# Patient Record
Sex: FEMALE | Race: ASIAN | NOT HISPANIC OR LATINO
[De-identification: names, ages, dates, MRNs, and addresses within clinical notes are randomized per-mention and may not be internally consistent; named-entity substitution may affect disease eponyms.]

---

## 2017-01-23 ENCOUNTER — APPOINTMENT (OUTPATIENT)
Dept: INTERNAL MEDICINE | Facility: CLINIC | Age: 42
End: 2017-01-23

## 2017-01-25 ENCOUNTER — RX RENEWAL (OUTPATIENT)
Age: 42
End: 2017-01-25

## 2017-02-27 ENCOUNTER — OUTPATIENT (OUTPATIENT)
Dept: OUTPATIENT SERVICES | Facility: HOSPITAL | Age: 42
LOS: 1 days | End: 2017-02-27
Payer: MEDICAID

## 2017-02-27 ENCOUNTER — APPOINTMENT (OUTPATIENT)
Dept: INTERNAL MEDICINE | Facility: CLINIC | Age: 42
End: 2017-02-27

## 2017-02-27 VITALS
WEIGHT: 231 LBS | TEMPERATURE: 99.1 F | HEIGHT: 65 IN | BODY MASS INDEX: 38.49 KG/M2 | SYSTOLIC BLOOD PRESSURE: 120 MMHG | DIASTOLIC BLOOD PRESSURE: 90 MMHG

## 2017-02-27 DIAGNOSIS — I10 ESSENTIAL (PRIMARY) HYPERTENSION: ICD-10-CM

## 2017-02-27 PROCEDURE — G0463: CPT

## 2017-02-28 DIAGNOSIS — J06.9 ACUTE UPPER RESPIRATORY INFECTION, UNSPECIFIED: ICD-10-CM

## 2017-05-08 ENCOUNTER — LABORATORY RESULT (OUTPATIENT)
Age: 42
End: 2017-05-08

## 2017-05-08 ENCOUNTER — OUTPATIENT (OUTPATIENT)
Dept: OUTPATIENT SERVICES | Facility: HOSPITAL | Age: 42
LOS: 1 days | End: 2017-05-08
Payer: MEDICAID

## 2017-05-08 ENCOUNTER — APPOINTMENT (OUTPATIENT)
Dept: INTERNAL MEDICINE | Facility: CLINIC | Age: 42
End: 2017-05-08

## 2017-05-08 VITALS
OXYGEN SATURATION: 98 % | SYSTOLIC BLOOD PRESSURE: 124 MMHG | HEART RATE: 85 BPM | HEIGHT: 65 IN | BODY MASS INDEX: 38.65 KG/M2 | DIASTOLIC BLOOD PRESSURE: 90 MMHG | WEIGHT: 232 LBS

## 2017-05-08 DIAGNOSIS — I10 ESSENTIAL (PRIMARY) HYPERTENSION: ICD-10-CM

## 2017-05-08 DIAGNOSIS — Z87.09 PERSONAL HISTORY OF OTHER DISEASES OF THE RESPIRATORY SYSTEM: ICD-10-CM

## 2017-05-08 LAB
ALBUMIN SERPL ELPH-MCNC: 4.7 G/DL — SIGNIFICANT CHANGE UP (ref 3.3–5)
ALP SERPL-CCNC: 53 U/L — SIGNIFICANT CHANGE UP (ref 40–120)
ALT FLD-CCNC: 18 U/L — SIGNIFICANT CHANGE UP (ref 10–45)
ANION GAP SERPL CALC-SCNC: 21 MMOL/L — HIGH (ref 5–17)
AST SERPL-CCNC: 14 U/L — SIGNIFICANT CHANGE UP (ref 10–40)
BILIRUB SERPL-MCNC: 0.2 MG/DL — SIGNIFICANT CHANGE UP (ref 0.2–1.2)
BUN SERPL-MCNC: 17 MG/DL — SIGNIFICANT CHANGE UP (ref 7–23)
CALCIUM SERPL-MCNC: 11.3 MG/DL — HIGH (ref 8.4–10.5)
CHLORIDE SERPL-SCNC: 102 MMOL/L — SIGNIFICANT CHANGE UP (ref 96–108)
CHOLEST SERPL-MCNC: 224 MG/DL — HIGH (ref 10–199)
CO2 SERPL-SCNC: 23 MMOL/L — SIGNIFICANT CHANGE UP (ref 22–31)
CREAT SERPL-MCNC: 0.73 MG/DL — SIGNIFICANT CHANGE UP (ref 0.5–1.3)
GLUCOSE SERPL-MCNC: 128 MG/DL — HIGH (ref 70–99)
HBA1C BLD-MCNC: 6.7 % — HIGH (ref 4–5.6)
HCT VFR BLD CALC: 43 % — SIGNIFICANT CHANGE UP (ref 34.5–45)
HDLC SERPL-MCNC: 57 MG/DL — SIGNIFICANT CHANGE UP (ref 40–125)
HGB BLD-MCNC: 13.4 G/DL — SIGNIFICANT CHANGE UP (ref 11.5–15.5)
LIPID PNL WITH DIRECT LDL SERPL: 129 MG/DL — SIGNIFICANT CHANGE UP
MCHC RBC-ENTMCNC: 26.2 PG — LOW (ref 27–34)
MCHC RBC-ENTMCNC: 31.2 GM/DL — LOW (ref 32–36)
MCV RBC AUTO: 84 FL — SIGNIFICANT CHANGE UP (ref 80–100)
PLATELET # BLD AUTO: 420 K/UL — HIGH (ref 150–400)
POTASSIUM SERPL-MCNC: 4.9 MMOL/L — SIGNIFICANT CHANGE UP (ref 3.5–5.3)
POTASSIUM SERPL-SCNC: 4.9 MMOL/L — SIGNIFICANT CHANGE UP (ref 3.5–5.3)
PROT SERPL-MCNC: 8.1 G/DL — SIGNIFICANT CHANGE UP (ref 6–8.3)
RBC # BLD: 5.12 M/UL — SIGNIFICANT CHANGE UP (ref 3.8–5.2)
RBC # FLD: 14.4 % — SIGNIFICANT CHANGE UP (ref 10.3–14.5)
SODIUM SERPL-SCNC: 146 MMOL/L — HIGH (ref 135–145)
TOTAL CHOLESTEROL/HDL RATIO MEASUREMENT: 4 RATIO — SIGNIFICANT CHANGE UP (ref 3.3–7.1)
TRIGL SERPL-MCNC: 192 MG/DL — HIGH (ref 10–149)
TSH SERPL-MCNC: 1.32 UIU/ML — SIGNIFICANT CHANGE UP (ref 0.27–4.2)
WBC # BLD: 7.12 K/UL — SIGNIFICANT CHANGE UP (ref 3.8–10.5)
WBC # FLD AUTO: 7.12 K/UL — SIGNIFICANT CHANGE UP (ref 3.8–10.5)

## 2017-05-08 PROCEDURE — 80053 COMPREHEN METABOLIC PANEL: CPT

## 2017-05-08 PROCEDURE — 85027 COMPLETE CBC AUTOMATED: CPT

## 2017-05-08 PROCEDURE — G0463: CPT

## 2017-05-08 PROCEDURE — 84443 ASSAY THYROID STIM HORMONE: CPT

## 2017-05-08 PROCEDURE — 80061 LIPID PANEL: CPT

## 2017-05-08 PROCEDURE — 83036 HEMOGLOBIN GLYCOSYLATED A1C: CPT

## 2017-05-08 RX ORDER — AZITHROMYCIN 250 MG/1
250 TABLET, FILM COATED ORAL
Qty: 6 | Refills: 0 | Status: DISCONTINUED | COMMUNITY
Start: 2017-02-27 | End: 2017-05-08

## 2017-05-12 ENCOUNTER — MEDICATION RENEWAL (OUTPATIENT)
Age: 42
End: 2017-05-12

## 2017-05-18 ENCOUNTER — MEDICATION RENEWAL (OUTPATIENT)
Age: 42
End: 2017-05-18

## 2017-05-19 DIAGNOSIS — E11.9 TYPE 2 DIABETES MELLITUS WITHOUT COMPLICATIONS: ICD-10-CM

## 2017-07-03 ENCOUNTER — LABORATORY RESULT (OUTPATIENT)
Age: 42
End: 2017-07-03

## 2017-07-03 ENCOUNTER — OUTPATIENT (OUTPATIENT)
Dept: OUTPATIENT SERVICES | Facility: HOSPITAL | Age: 42
LOS: 1 days | End: 2017-07-03
Payer: MEDICAID

## 2017-07-03 PROCEDURE — 85027 COMPLETE CBC AUTOMATED: CPT

## 2017-07-03 PROCEDURE — 83036 HEMOGLOBIN GLYCOSYLATED A1C: CPT

## 2017-07-03 PROCEDURE — 80053 COMPREHEN METABOLIC PANEL: CPT

## 2017-07-03 PROCEDURE — 84443 ASSAY THYROID STIM HORMONE: CPT

## 2017-07-03 PROCEDURE — 80061 LIPID PANEL: CPT

## 2017-07-05 DIAGNOSIS — I10 ESSENTIAL (PRIMARY) HYPERTENSION: ICD-10-CM

## 2017-07-05 DIAGNOSIS — E11.9 TYPE 2 DIABETES MELLITUS WITHOUT COMPLICATIONS: ICD-10-CM

## 2017-07-05 LAB
ALBUMIN SERPL ELPH-MCNC: 4.5 G/DL — SIGNIFICANT CHANGE UP (ref 3.3–5)
ALP SERPL-CCNC: 55 U/L — SIGNIFICANT CHANGE UP (ref 40–120)
ALT FLD-CCNC: 19 U/L — SIGNIFICANT CHANGE UP (ref 10–45)
ANION GAP SERPL CALC-SCNC: 17 MMOL/L — SIGNIFICANT CHANGE UP (ref 5–17)
AST SERPL-CCNC: 17 U/L — SIGNIFICANT CHANGE UP (ref 10–40)
BILIRUB SERPL-MCNC: <0.2 MG/DL — SIGNIFICANT CHANGE UP (ref 0.2–1.2)
BUN SERPL-MCNC: 15 MG/DL — SIGNIFICANT CHANGE UP (ref 7–23)
CALCIUM SERPL-MCNC: 10.1 MG/DL — SIGNIFICANT CHANGE UP (ref 8.4–10.5)
CHLORIDE SERPL-SCNC: 101 MMOL/L — SIGNIFICANT CHANGE UP (ref 96–108)
CHOLEST SERPL-MCNC: 214 MG/DL — HIGH (ref 10–199)
CO2 SERPL-SCNC: 24 MMOL/L — SIGNIFICANT CHANGE UP (ref 22–31)
CREAT SERPL-MCNC: 0.63 MG/DL — SIGNIFICANT CHANGE UP (ref 0.5–1.3)
GLUCOSE SERPL-MCNC: 135 MG/DL — HIGH (ref 70–99)
HBA1C BLD-MCNC: 6.8 % — HIGH (ref 4–5.6)
HCT VFR BLD CALC: 43.6 % — SIGNIFICANT CHANGE UP (ref 34.5–45)
HDLC SERPL-MCNC: 58 MG/DL — SIGNIFICANT CHANGE UP (ref 40–125)
HGB BLD-MCNC: 13.4 G/DL — SIGNIFICANT CHANGE UP (ref 11.5–15.5)
LIPID PNL WITH DIRECT LDL SERPL: 120 MG/DL — SIGNIFICANT CHANGE UP
MCHC RBC-ENTMCNC: 27.2 PG — SIGNIFICANT CHANGE UP (ref 27–34)
MCHC RBC-ENTMCNC: 30.7 GM/DL — LOW (ref 32–36)
MCV RBC AUTO: 88.4 FL — SIGNIFICANT CHANGE UP (ref 80–100)
PLATELET # BLD AUTO: 354 K/UL — SIGNIFICANT CHANGE UP (ref 150–400)
POTASSIUM SERPL-MCNC: 4.8 MMOL/L — SIGNIFICANT CHANGE UP (ref 3.5–5.3)
POTASSIUM SERPL-SCNC: 4.8 MMOL/L — SIGNIFICANT CHANGE UP (ref 3.5–5.3)
PROT SERPL-MCNC: 7.5 G/DL — SIGNIFICANT CHANGE UP (ref 6–8.3)
RBC # BLD: 4.93 M/UL — SIGNIFICANT CHANGE UP (ref 3.8–5.2)
RBC # FLD: 14.7 % — HIGH (ref 10.3–14.5)
SODIUM SERPL-SCNC: 142 MMOL/L — SIGNIFICANT CHANGE UP (ref 135–145)
TOTAL CHOLESTEROL/HDL RATIO MEASUREMENT: 3.7 RATIO — SIGNIFICANT CHANGE UP (ref 3.3–7.1)
TRIGL SERPL-MCNC: 180 MG/DL — HIGH (ref 10–149)
TSH SERPL-MCNC: 1.04 UIU/ML — SIGNIFICANT CHANGE UP (ref 0.27–4.2)
WBC # BLD: 5.95 K/UL — SIGNIFICANT CHANGE UP (ref 3.8–10.5)
WBC # FLD AUTO: 5.95 K/UL — SIGNIFICANT CHANGE UP (ref 3.8–10.5)

## 2017-09-01 ENCOUNTER — OUTPATIENT (OUTPATIENT)
Dept: OUTPATIENT SERVICES | Facility: HOSPITAL | Age: 42
LOS: 1 days | End: 2017-09-01
Payer: MEDICAID

## 2017-09-01 PROCEDURE — G9001: CPT

## 2017-09-12 ENCOUNTER — INPATIENT (INPATIENT)
Facility: HOSPITAL | Age: 42
LOS: 2 days | Discharge: HOME | DRG: 872 | End: 2017-09-15
Attending: HOSPITALIST | Admitting: INTERNAL MEDICINE
Payer: MEDICAID

## 2017-09-12 VITALS
DIASTOLIC BLOOD PRESSURE: 111 MMHG | OXYGEN SATURATION: 96 % | RESPIRATION RATE: 20 BRPM | TEMPERATURE: 100 F | HEART RATE: 118 BPM | SYSTOLIC BLOOD PRESSURE: 175 MMHG

## 2017-09-12 DIAGNOSIS — Z90.710 ACQUIRED ABSENCE OF BOTH CERVIX AND UTERUS: Chronic | ICD-10-CM

## 2017-09-12 PROCEDURE — 10061 I&D ABSCESS COMP/MULTIPLE: CPT

## 2017-09-12 PROCEDURE — 99285 EMERGENCY DEPT VISIT HI MDM: CPT | Mod: 25

## 2017-09-12 NOTE — ED ADULT NURSE NOTE - PMH
Diabetes mellitus    GERD (gastroesophageal reflux disease)    Hypertension  onset 8/2012  IBS (irritable bowel syndrome)    Obesity    Uterine cancer    Uterine cancer

## 2017-09-13 DIAGNOSIS — Z29.9 ENCOUNTER FOR PROPHYLACTIC MEASURES, UNSPECIFIED: ICD-10-CM

## 2017-09-13 DIAGNOSIS — E11.9 TYPE 2 DIABETES MELLITUS WITHOUT COMPLICATIONS: ICD-10-CM

## 2017-09-13 DIAGNOSIS — E28.39 OTHER PRIMARY OVARIAN FAILURE: ICD-10-CM

## 2017-09-13 DIAGNOSIS — L02.91 CUTANEOUS ABSCESS, UNSPECIFIED: ICD-10-CM

## 2017-09-13 DIAGNOSIS — R69 ILLNESS, UNSPECIFIED: ICD-10-CM

## 2017-09-13 DIAGNOSIS — I10 ESSENTIAL (PRIMARY) HYPERTENSION: ICD-10-CM

## 2017-09-13 LAB
ALBUMIN SERPL ELPH-MCNC: 4.9 G/DL — SIGNIFICANT CHANGE UP (ref 3.3–5)
ALP SERPL-CCNC: 59 U/L — SIGNIFICANT CHANGE UP (ref 40–120)
ALT FLD-CCNC: 17 U/L RC — SIGNIFICANT CHANGE UP (ref 10–45)
ANION GAP SERPL CALC-SCNC: 18 MMOL/L — HIGH (ref 5–17)
AST SERPL-CCNC: 14 U/L — SIGNIFICANT CHANGE UP (ref 10–40)
BASOPHILS # BLD AUTO: 0.1 K/UL — SIGNIFICANT CHANGE UP (ref 0–0.2)
BASOPHILS NFR BLD AUTO: 0.4 % — SIGNIFICANT CHANGE UP (ref 0–2)
BILIRUB SERPL-MCNC: 0.4 MG/DL — SIGNIFICANT CHANGE UP (ref 0.2–1.2)
BUN SERPL-MCNC: 10 MG/DL — SIGNIFICANT CHANGE UP (ref 7–23)
CALCIUM SERPL-MCNC: 10 MG/DL — SIGNIFICANT CHANGE UP (ref 8.4–10.5)
CHLORIDE SERPL-SCNC: 101 MMOL/L — SIGNIFICANT CHANGE UP (ref 96–108)
CO2 SERPL-SCNC: 23 MMOL/L — SIGNIFICANT CHANGE UP (ref 22–31)
CREAT SERPL-MCNC: 0.68 MG/DL — SIGNIFICANT CHANGE UP (ref 0.5–1.3)
EOSINOPHIL # BLD AUTO: 0.1 K/UL — SIGNIFICANT CHANGE UP (ref 0–0.5)
EOSINOPHIL NFR BLD AUTO: 0.7 % — SIGNIFICANT CHANGE UP (ref 0–6)
GAS PNL BLDV: SIGNIFICANT CHANGE UP
GLUCOSE SERPL-MCNC: 143 MG/DL — HIGH (ref 70–99)
HCT VFR BLD CALC: 41.6 % — SIGNIFICANT CHANGE UP (ref 34.5–45)
HGB BLD-MCNC: 14.1 G/DL — SIGNIFICANT CHANGE UP (ref 11.5–15.5)
LYMPHOCYTES # BLD AUTO: 14.4 % — SIGNIFICANT CHANGE UP (ref 13–44)
LYMPHOCYTES # BLD AUTO: 2 K/UL — SIGNIFICANT CHANGE UP (ref 1–3.3)
MCHC RBC-ENTMCNC: 29 PG — SIGNIFICANT CHANGE UP (ref 27–34)
MCHC RBC-ENTMCNC: 33.8 GM/DL — SIGNIFICANT CHANGE UP (ref 32–36)
MCV RBC AUTO: 85.8 FL — SIGNIFICANT CHANGE UP (ref 80–100)
MONOCYTES # BLD AUTO: 1 K/UL — HIGH (ref 0–0.9)
MONOCYTES NFR BLD AUTO: 7.1 % — SIGNIFICANT CHANGE UP (ref 2–14)
NEUTROPHILS # BLD AUTO: 10.6 K/UL — HIGH (ref 1.8–7.4)
NEUTROPHILS NFR BLD AUTO: 77.5 % — HIGH (ref 43–77)
PLATELET # BLD AUTO: 346 K/UL — SIGNIFICANT CHANGE UP (ref 150–400)
POTASSIUM SERPL-MCNC: 3.6 MMOL/L — SIGNIFICANT CHANGE UP (ref 3.5–5.3)
POTASSIUM SERPL-SCNC: 3.6 MMOL/L — SIGNIFICANT CHANGE UP (ref 3.5–5.3)
PROT SERPL-MCNC: 8.5 G/DL — HIGH (ref 6–8.3)
RBC # BLD: 4.85 M/UL — SIGNIFICANT CHANGE UP (ref 3.8–5.2)
RBC # FLD: 12.3 % — SIGNIFICANT CHANGE UP (ref 10.3–14.5)
SODIUM SERPL-SCNC: 142 MMOL/L — SIGNIFICANT CHANGE UP (ref 135–145)
WBC # BLD: 13.6 K/UL — HIGH (ref 3.8–10.5)
WBC # FLD AUTO: 13.6 K/UL — HIGH (ref 3.8–10.5)

## 2017-09-13 PROCEDURE — 99223 1ST HOSP IP/OBS HIGH 75: CPT | Mod: GC

## 2017-09-13 RX ORDER — LOSARTAN POTASSIUM 100 MG/1
1 TABLET, FILM COATED ORAL
Qty: 0 | Refills: 0 | COMMUNITY

## 2017-09-13 RX ORDER — SODIUM CHLORIDE 9 MG/ML
3000 INJECTION INTRAMUSCULAR; INTRAVENOUS; SUBCUTANEOUS ONCE
Qty: 0 | Refills: 0 | Status: COMPLETED | OUTPATIENT
Start: 2017-09-13 | End: 2017-09-13

## 2017-09-13 RX ORDER — INSULIN LISPRO 100/ML
VIAL (ML) SUBCUTANEOUS
Qty: 0 | Refills: 0 | Status: DISCONTINUED | OUTPATIENT
Start: 2017-09-13 | End: 2017-09-15

## 2017-09-13 RX ORDER — ENOXAPARIN SODIUM 100 MG/ML
40 INJECTION SUBCUTANEOUS EVERY 24 HOURS
Qty: 0 | Refills: 0 | Status: DISCONTINUED | OUTPATIENT
Start: 2017-09-13 | End: 2017-09-15

## 2017-09-13 RX ORDER — IBUPROFEN 200 MG
800 TABLET ORAL ONCE
Qty: 0 | Refills: 0 | Status: COMPLETED | OUTPATIENT
Start: 2017-09-13 | End: 2017-09-13

## 2017-09-13 RX ORDER — DEXTROSE 50 % IN WATER 50 %
25 SYRINGE (ML) INTRAVENOUS ONCE
Qty: 0 | Refills: 0 | Status: DISCONTINUED | OUTPATIENT
Start: 2017-09-13 | End: 2017-09-15

## 2017-09-13 RX ORDER — METOPROLOL TARTRATE 50 MG
12.5 TABLET ORAL
Qty: 0 | Refills: 0 | COMMUNITY

## 2017-09-13 RX ORDER — DEXTROSE 50 % IN WATER 50 %
12.5 SYRINGE (ML) INTRAVENOUS ONCE
Qty: 0 | Refills: 0 | Status: DISCONTINUED | OUTPATIENT
Start: 2017-09-13 | End: 2017-09-15

## 2017-09-13 RX ORDER — GLUCAGON INJECTION, SOLUTION 0.5 MG/.1ML
1 INJECTION, SOLUTION SUBCUTANEOUS ONCE
Qty: 0 | Refills: 0 | Status: DISCONTINUED | OUTPATIENT
Start: 2017-09-13 | End: 2017-09-15

## 2017-09-13 RX ORDER — INFLUENZA VIRUS VACCINE 15; 15; 15; 15 UG/.5ML; UG/.5ML; UG/.5ML; UG/.5ML
0.5 SUSPENSION INTRAMUSCULAR ONCE
Qty: 0 | Refills: 0 | Status: COMPLETED | OUTPATIENT
Start: 2017-09-13 | End: 2017-09-13

## 2017-09-13 RX ORDER — METFORMIN HYDROCHLORIDE 850 MG/1
1 TABLET ORAL
Qty: 0 | Refills: 0 | COMMUNITY

## 2017-09-13 RX ORDER — LOSARTAN POTASSIUM 100 MG/1
25 TABLET, FILM COATED ORAL DAILY
Qty: 0 | Refills: 0 | Status: DISCONTINUED | OUTPATIENT
Start: 2017-09-13 | End: 2017-09-15

## 2017-09-13 RX ORDER — METOPROLOL TARTRATE 50 MG
12.5 TABLET ORAL
Qty: 0 | Refills: 0 | Status: DISCONTINUED | OUTPATIENT
Start: 2017-09-13 | End: 2017-09-13

## 2017-09-13 RX ORDER — DEXTROSE 50 % IN WATER 50 %
1 SYRINGE (ML) INTRAVENOUS ONCE
Qty: 0 | Refills: 0 | Status: DISCONTINUED | OUTPATIENT
Start: 2017-09-13 | End: 2017-09-15

## 2017-09-13 RX ORDER — INSULIN LISPRO 100/ML
VIAL (ML) SUBCUTANEOUS AT BEDTIME
Qty: 0 | Refills: 0 | Status: DISCONTINUED | OUTPATIENT
Start: 2017-09-13 | End: 2017-09-15

## 2017-09-13 RX ORDER — SODIUM CHLORIDE 9 MG/ML
1000 INJECTION, SOLUTION INTRAVENOUS
Qty: 0 | Refills: 0 | Status: DISCONTINUED | OUTPATIENT
Start: 2017-09-13 | End: 2017-09-15

## 2017-09-13 RX ORDER — VANCOMYCIN HCL 1 G
1000 VIAL (EA) INTRAVENOUS EVERY 12 HOURS
Qty: 0 | Refills: 0 | Status: DISCONTINUED | OUTPATIENT
Start: 2017-09-13 | End: 2017-09-15

## 2017-09-13 RX ADMIN — Medication 250 MILLIGRAM(S): at 19:06

## 2017-09-13 RX ADMIN — Medication 100 MILLIGRAM(S): at 18:03

## 2017-09-13 RX ADMIN — Medication 800 MILLIGRAM(S): at 21:50

## 2017-09-13 RX ADMIN — ENOXAPARIN SODIUM 40 MILLIGRAM(S): 100 INJECTION SUBCUTANEOUS at 18:04

## 2017-09-13 RX ADMIN — Medication 800 MILLIGRAM(S): at 20:53

## 2017-09-13 RX ADMIN — Medication 800 MILLIGRAM(S): at 11:29

## 2017-09-13 RX ADMIN — Medication 800 MILLIGRAM(S): at 07:44

## 2017-09-13 RX ADMIN — LOSARTAN POTASSIUM 25 MILLIGRAM(S): 100 TABLET, FILM COATED ORAL at 22:57

## 2017-09-13 RX ADMIN — SODIUM CHLORIDE 6000 MILLILITER(S): 9 INJECTION INTRAMUSCULAR; INTRAVENOUS; SUBCUTANEOUS at 02:10

## 2017-09-13 RX ADMIN — Medication 100 MILLIGRAM(S): at 02:15

## 2017-09-13 NOTE — ED PROVIDER NOTE - ATTENDING CONTRIBUTION TO CARE
I have examined and evaluated this patient with the above resident or PA, and agree with the documented clinical history, exam and plan.   Briefly: 42 yo F with h/o IDDM, presenting to the ED with abscess and celllitis on the left thigh.  Large abscess induration and surrounding cellulitis appreicated on exam; given failure of outpateitn ABx and h/o DM, have started clindamycin intravenously.  Abscess draining spontaneously as per history and exam; further incision/drainage produced additional small amount of purulent material and blood, loculations appreciated and broken up manually.  Wound leaned / dressed.  Blood and wound cultures sent.  Pt will be admitted to medicine service for continued wound checks, iv antibiotics.

## 2017-09-13 NOTE — H&P ADULT - PROBLEM SELECTOR PLAN 5
- SubQ lovenox prophylaxis  - Diabetic diet    Patricia Gandhi MD  Internal Medicine, Intern  Pager [phone]

## 2017-09-13 NOTE — ED ADULT NURSE REASSESSMENT NOTE - NS ED NURSE REASSESS COMMENT FT1
0130- patient alert & oriented x3. V/S recheched. Patient stated "My pain is getting worst"
VS stable she is in no acute distress, pt denies pain at this time, pt pending bed. Safety and comfort maintained

## 2017-09-13 NOTE — ED PROVIDER NOTE - OBJECTIVE STATEMENT
41yof w/ HTN, DM2 p/w left anterior leg abscess and chills. Noticed a small pustule on the leg a week ago, gradually getting larger and more painful. Was seen by her PMD who started ciprofloxacin, however today she started to have increasing pain and drainage. No trauma, bites or known exposures. No measured fevers.

## 2017-09-13 NOTE — H&P ADULT - PMH
Diabetes mellitus    GERD (gastroesophageal reflux disease)    Hypertension  onset 8/2012  IBS (irritable bowel syndrome)    Obesity    Uterine cancer    Uterine cancer Diabetes mellitus    GERD (gastroesophageal reflux disease)    Hypertension  onset 8/2012  IBS (irritable bowel syndrome)    Obesity    Uterine cancer

## 2017-09-13 NOTE — H&P ADULT - PROBLEM SELECTOR PLAN 1
Continues to appear fluctuant. s/p I+D at the ED.   - Resuming clindamycin for MRSA coverage  - Consider cefepime or ciprofloxacin for pseudomonal coverage if patient becomes febrile or with worsening WBC ct  - Surgery consult for possible surgical drainage as continues Continues to appear fluctuant. s/p I+D at the ED.   - Resuming clindamycin for MRSA coverage  - Consider cefepime or ciprofloxacin for pseudomonal coverage if patient becomes febrile or with worsening WBC ct  - Surgery consult for possible surgical drainage Continues to appear fluctuant. s/p I+D at the ED, however unclear if purulence was fully expressed and exam is c/w persistent abscess with cellultis and will likely need additional drainage.  - start vancomycin, check trought before 4th dose  - please have RN demarcate area of cellulitis to monitor for improvement  - f/u wound cx  - Consider cefepime or ciprofloxacin for pseudomonal coverage given dm2 hx if patient becomes febrile or with worsening WBC ct  - Surgery consulted for additional surgical drainage. May need u/s for further eval.  - start ibuprofen prn for pain control

## 2017-09-13 NOTE — H&P ADULT - NSHPPHYSICALEXAM_GEN_ALL_CORE
General: No acute distress  Eyes: No icterus, mildly dry mucous membranes  Pulm: CTAB no r/r/w  Cards: RRR no m/r/g  Abd: soft, NT, ND, BS+ General: No acute distress  Eyes: No icterus, mildly dry mucous membranes  Pulm: CTAB no r/r/w  Cards: RRR no m/r/g  Abd: soft, NT, ND, BS+  Skin: Large several centimeter left thigh lesion with induration and tenderness to palpation, with superficial ulceration  Vasc: 2+ DP pulses, no cyanosis  Psych: appropriate  Ext: No lower extremity edema General: No acute distress  Eyes: No icterus, mildly dry mucous membranes  Neck: supple, no JVD  Pulm: CTAB no r/r/w  Cards: RRR no m/r/g  Abd: soft, NT, ND, BS+  Skin: Large several centimeter left thigh protuberant lesion with induration erythema and tenderness to palpation, with incision cameron with surrounding erythema and warmth. No purulence expressed per lesion.  Vasc: 2+ DP pulses, no cyanosis  Psych: appropriate  Ext: No lower extremity edema

## 2017-09-13 NOTE — ED PROCEDURE NOTE - PROCEDURE ADDITIONAL DETAILS
Abscess draining spontaneously as per history and exam; further incision/drainage produced additional small amount of purulent material and blood, loculations appreciated and broken up manually.  Wound leaned / dressed.

## 2017-09-13 NOTE — ED PROVIDER NOTE - SKIN, MLM
Skin normal color for race, warm, dry. 3cm round area of fluctuance w/ central area of purulent drainage w/ surrounding erythema and induration approx 10cm localized to the mid anterior thigh on the left leg, no crepitus, no

## 2017-09-13 NOTE — H&P ADULT - NSHPSOCIALHISTORY_GEN_ALL_CORE
Does not smoke or use illicit drugs. Occasionally/rarely drinks. Lives with brother and mother. Has a dog. Works as a massage therapist.

## 2017-09-13 NOTE — H&P ADULT - ASSESSMENT
42 yo F pmhx T2DM, HTN, uterine malignancy s/p hysterectomy (Nov 2012) who presents with leg abscess. 40 yo F pmhx T2DM, HTN, obesity, GERD, uterine malignancy s/p hysterectomy (Nov 2012) who presents with left thigh pain/induration admitted for sepsis due to abscess/cellulitis.

## 2017-09-13 NOTE — H&P ADULT - PROBLEM SELECTOR PLAN 3
- Repeat HgA1c in AM  - Continue fingerticks  - Sliding scale insulin  - Hold home metformin a1c 6.8, on metformin  - Continue fingersticks  - Sliding scale insulin  - Hold home metformin

## 2017-09-13 NOTE — H&P ADULT - NSHPLABSRESULTS_GEN_ALL_CORE
CBC Full  -  ( 13 Sep 2017 02:09 )  WBC Count : 13.6 K/uL  Hemoglobin : 14.1 g/dL  Hematocrit : 41.6 %  Platelet Count - Automated : 346 K/uL  Mean Cell Volume : 85.8 fl  Mean Cell Hemoglobin : 29.0 pg  Mean Cell Hemoglobin Concentration : 33.8 gm/dL  Auto Neutrophil # : 10.6 K/uL  Auto Lymphocyte # : 2.0 K/uL  Auto Monocyte # : 1.0 K/uL  Auto Eosinophil # : 0.1 K/uL  Auto Basophil # : 0.1 K/uL  Auto Neutrophil % : 77.5 %  Auto Lymphocyte % : 14.4 %  Auto Monocyte % : 7.1 %  Auto Eosinophil % : 0.7 %  Auto Basophil % : 0.4 %      09-13    142  |  101  |  10  ----------------------------<  143<H>  3.6   |  23  |  0.68    Ca    10.0      13 Sep 2017 02:09    TPro  8.5<H>  /  Alb  4.9  /  TBili  0.4  /  DBili  x   /  AST  14  /  ALT  17  /  AlkPhos  59  09-13      CAPILLARY BLOOD GLUCOSE  120 (13 Sep 2017 16:39)    Blood gas venous with lytes  09-13 @ 02:09  pH 7.39  pCO2 44  pO2 44  bicarbonate 26  hemoglobin --  potassium 3.2    VBG lactate: Blood Gas Venous - Lactate: 1.8 mmoL/L (13 Sep 2017 02:09)    Cultures: Pending CBC Full  -  ( 13 Sep 2017 02:09 )  WBC Count : 13.6 K/uL  Hemoglobin : 14.1 g/dL  Hematocrit : 41.6 %  Platelet Count - Automated : 346 K/uL  Mean Cell Volume : 85.8 fl  Mean Cell Hemoglobin : 29.0 pg  Mean Cell Hemoglobin Concentration : 33.8 gm/dL  Auto Neutrophil # : 10.6 K/uL  Auto Lymphocyte # : 2.0 K/uL  Auto Monocyte # : 1.0 K/uL  Auto Eosinophil # : 0.1 K/uL  Auto Basophil # : 0.1 K/uL  Auto Neutrophil % : 77.5 %  Auto Lymphocyte % : 14.4 %  Auto Monocyte % : 7.1 %  Auto Eosinophil % : 0.7 %  Auto Basophil % : 0.4 %      09-13    142  |  101  |  10  ----------------------------<  143<H>  3.6   |  23  |  0.68    Ca    10.0      13 Sep 2017 02:09    TPro  8.5<H>  /  Alb  4.9  /  TBili  0.4  /  DBili  x   /  AST  14  /  ALT  17  /  AlkPhos  59  09-13      CAPILLARY BLOOD GLUCOSE  120 (13 Sep 2017 16:39)    Blood gas venous with lytes  09-13 @ 02:09  pH 7.39  pCO2 44  pO2 44  bicarbonate 26  hemoglobin --  potassium 3.2    VBG lactate: Blood Gas Venous - Lactate: 1.8 mmoL/L (13 Sep 2017 02:09)    Cultures: Pending    LABS NOTABLE FOR ELEV WBC.

## 2017-09-13 NOTE — H&P ADULT - PROBLEM SELECTOR PLAN 2
- Continue home losartan 25 mg daily  - Hold metoprolol for now - Continue home losartan 25 mg daily  - Hold metoprolol for now as not to complicate sepsis picture

## 2017-09-13 NOTE — ED PROVIDER NOTE - FAMILY HISTORY
Father  Still living? Unknown  Family history of lymphoma, Age at diagnosis: Age Unknown     Mother  Still living? Unknown  Family history of rheumatoid arthritis, Age at diagnosis: Age Unknown

## 2017-09-13 NOTE — ED PROCEDURE NOTE - ATTENDING CONTRIBUTION TO CARE
I have participated in and supervised all key portions of the above procedures and agree with the above documentation. ESTELLE Conklin MD

## 2017-09-13 NOTE — H&P ADULT - NSHPREVIEWOFSYSTEMS_GEN_ALL_CORE
General: No current fevers or chills  Throat: No increased thirst, other than expected from not eating  Pulm: No shortness of breath  Cards: No chest pain or palpitations (although stated had palpitations from anxiety prior to I+D)  GI: No abdominal pain  Ext: Please see HPI Review of Systems:   CONSTITUTIONAL: No fever, weight loss  EYES: No eye pain, visual disturbances, or discharge  ENMT:  No difficulty hearing, tinnitus, vertigo; No sinus or throat pain  RESPIRATORY: No SOB. No cough, wheezing, chills or hemoptysis  CARDIOVASCULAR: No chest pain, palpitations, dizziness  GASTROINTESTINAL: No abdominal or epigastric pain. No nausea, vomiting, or hematemesis; No diarrhea or constipation. No melena or hematochezia.  GENITOURINARY: No dysuria, frequency, hematuria, or incontinence  NEUROLOGICAL: No headaches, memory loss, loss of strength, numbness, or tremors  SKIN: +abscess and redness of leg  LYMPH NODES: No enlarged glands  ENDOCRINE: No heat or cold intolerance; No hair loss  MUSCULOSKELETAL: No joint pain or swelling; No muscle, back pain  PSYCHIATRIC: No depression, anxiety, mood swings, or difficulty sleeping

## 2017-09-13 NOTE — ED PROVIDER NOTE - MEDICAL DECISION MAKING DETAILS
41yof diabetic w/ soft tissue abscess left anterior thigh, failed outpt abx, mounting systemic response. Cultures, labs, IV abx, I+D. Admit to medicine for IV abx. Hemodynamically stable.

## 2017-09-13 NOTE — H&P ADULT - HISTORY OF PRESENT ILLNESS
40 yo F pmhx T2DM, HTN, uterine malignancy s/p hysterectomy (Nov 2012) who presents with leg abscess. 42 yo F pmhx T2DM, HTN, uterine malignancy s/p hysterectomy (Nov 2012) who presents with leg abscess. 40 yo F pmhx T2DM, HTN, uterine malignancy s/p hysterectomy (Nov 2012) who presents with leg abscess. She noticed last Wednesday (about a week ago) a small dot on her leg, that appeared similar to a motley or pimple. After taking a shower she noticed that it had become open. She denied any trauma to her leg or any recent punctures to her leg. She applied neosporin and described that it became red the next day. On Friday she felt that it became firmer/harder with spreading redness. She went to her dermatologist who told her she had an abscess and started her on ciprofloxacin. She then noticed fever and chills with continued pain in her leg. Her pain improved with advil. Her temperature had spiked to 101.3, but then resolved after tylenol. She became concerned as her abscess had enlarged to the size of a golf ball and came to the ED.     While at the ED T99.8, 175/111, , 20, 96% RA. She had her abscess drained in the ED, but per patient this was limited by significant pain. While at the ED she received ibuprofen, clindamycin x1 and 3 L NS. At bedside she no longer has fevers or chills. Her leg pain is well controlled. 40 yo F pmhx T2DM, HTN, obesity, GERD, uterine malignancy s/p hysterectomy (Nov 2012) who presents with leg abscess. She noticed last Wednesday (about a week ago) a small dot on her leg, that appeared similar to a motley or pimple. After taking a shower she noticed that it had become open. She denied any trauma to her leg or any recent punctures to her leg. She applied neosporin and described that it became red the next day. On Friday she felt that it became firmer/harder with spreading redness. She went to her dermatologist who told her she had an abscess and started her on ciprofloxacin. She then noticed fever and chills with continued pain in her leg. Her pain improved with advil. Her temperature had spiked to 101.3, but then resolved after tylenol. She became concerned as her abscess had enlarged to the size of a golf ball and came to the ED. She has never had history of abscesses in the past.    While at the ED T99.8, 175/111, , 20, 96% RA. She had her abscess drained in the ED, but per patient this was limited by significant pain. While at the ED she received ibuprofen, clindamycin x1 and 3 L NS. At bedside she no longer has fevers or chills. Her leg pain is well controlled.

## 2017-09-14 ENCOUNTER — TRANSCRIPTION ENCOUNTER (OUTPATIENT)
Age: 42
End: 2017-09-14

## 2017-09-14 LAB
ANION GAP SERPL CALC-SCNC: 16 MMOL/L — SIGNIFICANT CHANGE UP (ref 5–17)
BASOPHILS # BLD AUTO: 0.03 K/UL — SIGNIFICANT CHANGE UP (ref 0–0.2)
BASOPHILS NFR BLD AUTO: 0.4 % — SIGNIFICANT CHANGE UP (ref 0–2)
BUN SERPL-MCNC: 9 MG/DL — SIGNIFICANT CHANGE UP (ref 7–23)
CALCIUM SERPL-MCNC: 9 MG/DL — SIGNIFICANT CHANGE UP (ref 8.4–10.5)
CHLORIDE SERPL-SCNC: 102 MMOL/L — SIGNIFICANT CHANGE UP (ref 96–108)
CO2 SERPL-SCNC: 23 MMOL/L — SIGNIFICANT CHANGE UP (ref 22–31)
CREAT SERPL-MCNC: 0.39 MG/DL — LOW (ref 0.5–1.3)
EOSINOPHIL # BLD AUTO: 0.19 K/UL — SIGNIFICANT CHANGE UP (ref 0–0.5)
EOSINOPHIL NFR BLD AUTO: 2.6 % — SIGNIFICANT CHANGE UP (ref 0–6)
GLUCOSE SERPL-MCNC: 125 MG/DL — HIGH (ref 70–99)
HBA1C BLD-MCNC: 6.6 % — HIGH (ref 4–5.6)
HCT VFR BLD CALC: 36.5 % — SIGNIFICANT CHANGE UP (ref 34.5–45)
HCV AB S/CO SERPL IA: 0.18 S/CO — SIGNIFICANT CHANGE UP
HCV AB SERPL-IMP: SIGNIFICANT CHANGE UP
HGB BLD-MCNC: 11.8 G/DL — SIGNIFICANT CHANGE UP (ref 11.5–15.5)
HIV 1+2 AB+HIV1 P24 AG SERPL QL IA: SIGNIFICANT CHANGE UP
IMM GRANULOCYTES NFR BLD AUTO: 0.4 % — SIGNIFICANT CHANGE UP (ref 0–1.5)
LYMPHOCYTES # BLD AUTO: 1.55 K/UL — SIGNIFICANT CHANGE UP (ref 1–3.3)
LYMPHOCYTES # BLD AUTO: 21.1 % — SIGNIFICANT CHANGE UP (ref 13–44)
MCHC RBC-ENTMCNC: 27.1 PG — SIGNIFICANT CHANGE UP (ref 27–34)
MCHC RBC-ENTMCNC: 32.3 GM/DL — SIGNIFICANT CHANGE UP (ref 32–36)
MCV RBC AUTO: 83.7 FL — SIGNIFICANT CHANGE UP (ref 80–100)
MONOCYTES # BLD AUTO: 0.7 K/UL — SIGNIFICANT CHANGE UP (ref 0–0.9)
MONOCYTES NFR BLD AUTO: 9.5 % — SIGNIFICANT CHANGE UP (ref 2–14)
NEUTROPHILS # BLD AUTO: 4.83 K/UL — SIGNIFICANT CHANGE UP (ref 1.8–7.4)
NEUTROPHILS NFR BLD AUTO: 66 % — SIGNIFICANT CHANGE UP (ref 43–77)
PLATELET # BLD AUTO: 365 K/UL — SIGNIFICANT CHANGE UP (ref 150–400)
POTASSIUM SERPL-MCNC: 3.5 MMOL/L — SIGNIFICANT CHANGE UP (ref 3.5–5.3)
POTASSIUM SERPL-SCNC: 3.5 MMOL/L — SIGNIFICANT CHANGE UP (ref 3.5–5.3)
RBC # BLD: 4.36 M/UL — SIGNIFICANT CHANGE UP (ref 3.8–5.2)
RBC # FLD: 14 % — SIGNIFICANT CHANGE UP (ref 10.3–14.5)
SODIUM SERPL-SCNC: 141 MMOL/L — SIGNIFICANT CHANGE UP (ref 135–145)
WBC # BLD: 7.33 K/UL — SIGNIFICANT CHANGE UP (ref 3.8–10.5)
WBC # FLD AUTO: 7.33 K/UL — SIGNIFICANT CHANGE UP (ref 3.8–10.5)

## 2017-09-14 PROCEDURE — 76882 US LMTD JT/FCL EVL NVASC XTR: CPT | Mod: 26,LT

## 2017-09-14 PROCEDURE — 99233 SBSQ HOSP IP/OBS HIGH 50: CPT | Mod: GC

## 2017-09-14 RX ORDER — ONDANSETRON 8 MG/1
4 TABLET, FILM COATED ORAL ONCE
Qty: 0 | Refills: 0 | Status: COMPLETED | OUTPATIENT
Start: 2017-09-14 | End: 2017-09-14

## 2017-09-14 RX ORDER — IBUPROFEN 200 MG
600 TABLET ORAL ONCE
Qty: 0 | Refills: 0 | Status: COMPLETED | OUTPATIENT
Start: 2017-09-14 | End: 2017-09-14

## 2017-09-14 RX ORDER — ACETAMINOPHEN 500 MG
650 TABLET ORAL EVERY 6 HOURS
Qty: 0 | Refills: 0 | Status: DISCONTINUED | OUTPATIENT
Start: 2017-09-14 | End: 2017-09-15

## 2017-09-14 RX ADMIN — ONDANSETRON 4 MILLIGRAM(S): 8 TABLET, FILM COATED ORAL at 19:13

## 2017-09-14 RX ADMIN — Medication 600 MILLIGRAM(S): at 13:22

## 2017-09-14 RX ADMIN — Medication 600 MILLIGRAM(S): at 13:52

## 2017-09-14 RX ADMIN — Medication 250 MILLIGRAM(S): at 06:11

## 2017-09-14 RX ADMIN — LOSARTAN POTASSIUM 25 MILLIGRAM(S): 100 TABLET, FILM COATED ORAL at 11:23

## 2017-09-14 RX ADMIN — Medication 650 MILLIGRAM(S): at 06:45

## 2017-09-14 RX ADMIN — Medication 250 MILLIGRAM(S): at 17:59

## 2017-09-14 RX ADMIN — ENOXAPARIN SODIUM 40 MILLIGRAM(S): 100 INJECTION SUBCUTANEOUS at 18:00

## 2017-09-14 RX ADMIN — Medication 0.5 MILLIGRAM(S): at 13:23

## 2017-09-14 NOTE — PROGRESS NOTE ADULT - PROBLEM SELECTOR PLAN 3
a1c 6.8, on metformin  - Continue fingersticks  - Sliding scale insulin  - Hold home metformin a1c 6.6, on metformin  - Continue fingersticks  - Sliding scale insulin  - Hold home metformin

## 2017-09-14 NOTE — DISCHARGE NOTE ADULT - PATIENT PORTAL LINK FT
“You can access the [employer] Patient Portal, offered by [facility], by registering with the following website: [email or web address]”

## 2017-09-14 NOTE — DISCHARGE NOTE ADULT - PROVIDER TOKENS
FREE:[LAST:[Hiram],FIRST:[Patricia],PHONE:[phone]],FAX:[(   [phone]    -],ADDRESS:[[address] [place] [address]  Internal Medicine [facility]  [place]]]

## 2017-09-14 NOTE — DISCHARGE NOTE ADULT - CARE PLAN
Principal Discharge DX:	Abscess  Goal:	Resolution Principal Discharge DX:	Abscess  Goal:	Resolution  Instructions for follow-up, activity and diet:	Please continue to take clindamycin 450 mg four times a day for the next 6 days. If you notice fevers, chills or worsening pain please seek medical attention immediately. Please make an appointment in clinic next week or the following week for close monitoring of your abscess at the [address] [place] [address]. You can request to see Dr. Gandhi the week of September 25th. If there is no availability please take the next available appointment to see a resident during the next two weeks.  Secondary Diagnosis:	Diabetes mellitus  Instructions for follow-up, activity and diet:	Please continue your home metformin. Please make an appointment at clinic. You can request a list of prices of glucometers and test strips during your next visit. Principal Discharge DX:	Abscess  Goal:	Resolution  Instructions for follow-up, activity and diet:	Please continue to take clindamycin 450 mg four times a day for the next 6 days. If you notice fevers, chills or worsening pain please seek medical attention immediately. Please make an appointment in clinic next week or the following week for close monitoring of your abscess at the [address] [place] [address]. You can request to see Dr. Gandhi the week of September 25th. If there is no availability please take the next available appointment to see a resident during the next two weeks.    You can continue to take showers but please keep your wound dry. Please change your dressing daily.  Secondary Diagnosis:	Diabetes mellitus  Instructions for follow-up, activity and diet:	Please continue your home metformin. Please make an appointment at clinic. You can request a list of prices of glucometers and test strips during your next visit. Principal Discharge DX:	Abscess  Goal:	Resolution  Instructions for follow-up, activity and diet:	Please continue to take clindamycin 450 mg four times a day for the next 6 days. Please  your script at [facility]. Please take this medication with probiotics or yogurt between doses. If you notice fevers, chills or worsening pain please seek medical attention immediately. Please make an appointment in clinic next week or the following week for close monitoring of your abscess at the [address] [place] [address]. You can request to see Dr. Gandhi the week of September 25th. If there is no availability please take the next available appointment to see a resident during the next two weeks.    You can continue to take showers but please keep your wound dry. Please change your dressing daily.  Secondary Diagnosis:	Diabetes mellitus  Instructions for follow-up, activity and diet:	Please continue your home metformin. Please make an appointment at clinic. You can request a list of prices of glucometers and test strips during your next visit.

## 2017-09-14 NOTE — DISCHARGE NOTE ADULT - CARE PROVIDER_API CALL
Patricia Gandhi  [address] [facility]  Internal Medicine Clinic  [place]  Phone: [phone]  Fax: (   )    -

## 2017-09-14 NOTE — PROVIDER CONTACT NOTE (OTHER) - ACTION/TREATMENT ORDERED:
MD notified and made aware. as per MD he will come down to assess patient. will continue to monitor.
Left thigh wound cleansed with sterile normal saline dry sterile gauze applied
Wound cleansed with sterile NS. Dry sterile gauge applied
MD notified and made aware, pain medication ordered, and given. will continue to monitor.

## 2017-09-14 NOTE — DISCHARGE NOTE ADULT - PLAN OF CARE
Resolution Please continue to take clindamycin 450 mg four times a day for the next 6 days. If you notice fevers, chills or worsening pain please seek medical attention immediately. Please make an appointment in clinic next week or the following week for close monitoring of your abscess at the [address] [place] [address]. You can request to see Dr. Gandhi the week of September 25th. If there is no availability please take the next available appointment to see a resident during the next two weeks. Please continue your home metformin. Please make an appointment at clinic. You can request a list of prices of glucometers and test strips during your next visit. Please continue to take clindamycin 450 mg four times a day for the next 6 days. If you notice fevers, chills or worsening pain please seek medical attention immediately. Please make an appointment in clinic next week or the following week for close monitoring of your abscess at the [address] [place] [address]. You can request to see Dr. Gandhi the week of September 25th. If there is no availability please take the next available appointment to see a resident during the next two weeks.    You can continue to take showers but please keep your wound dry. Please change your dressing daily. Please continue to take clindamycin 450 mg four times a day for the next 6 days. Please  your script at [facility]. Please take this medication with probiotics or yogurt between doses. If you notice fevers, chills or worsening pain please seek medical attention immediately. Please make an appointment in clinic next week or the following week for close monitoring of your abscess at the [address] [place] [address]. You can request to see Dr. Gandhi the week of September 25th. If there is no availability please take the next available appointment to see a resident during the next two weeks.    You can continue to take showers but please keep your wound dry. Please change your dressing daily.

## 2017-09-14 NOTE — PROVIDER CONTACT NOTE (OTHER) - REASON
Dressing and packing removed in ultrasound dept.
Dressing removed from Left thigh in ultrasound dept.
pt complain of L thigh pain
patient complains of pain at L thigh

## 2017-09-14 NOTE — DISCHARGE NOTE ADULT - MEDICATION SUMMARY - MEDICATIONS TO TAKE
I will START or STAY ON the medications listed below when I get home from the hospital:    acetaminophen 325 mg oral tablet  -- 2 tab(s) by mouth every 6 hours, As needed, Moderate Pain (4 - 6)  -- Indication: For Pain control    losartan 25 mg oral tablet  -- 1 tab(s) by mouth once a day  -- Indication: For Hypertension    metFORMIN 500 mg oral tablet  -- 1 tab(s) by mouth 2 times a day  -- Indication: For Diabetes mellitus    metoprolol  -- 12.5 milligram(s) by mouth 2 times a day  -- Indication: For Hypertension    clindamycin 150 mg oral capsule  -- 3 cap(s) by mouth 4 times a day  -- Indication: For Abscess    estradiol 0.5 mg oral tablet  -- 1 tab(s) by mouth once a day  -- Indication: For Estrogen deficiency

## 2017-09-14 NOTE — PROGRESS NOTE ADULT - ASSESSMENT
40 yo F pmhx T2DM, HTN, obesity, GERD, uterine malignancy s/p hysterectomy (Nov 2012) who presents with left thigh pain/induration admitted for sepsis due to abscess/cellulitis. 40 yo F pmhx T2DM, HTN, obesity, GERD, uterine malignancy s/p hysterectomy (Nov 2012) who presents with left thigh pain/induration admitted for sepsis due to abscess/cellulitis s/p i&d.

## 2017-09-14 NOTE — DISCHARGE NOTE ADULT - HOSPITAL COURSE
HPI:  42 yo F pmhx T2DM, HTN, obesity, GERD, uterine malignancy s/p hysterectomy (Nov 2012) who presents with leg abscess. She noticed last Wednesday (about a week ago) a small dot on her leg, that appeared similar to a motley or pimple. After taking a shower she noticed that it had become open. She denied any trauma to her leg or any recent punctures to her leg. She applied neosporin and described that it became red the next day. On Friday she felt that it became firmer/harder with spreading redness. She went to her dermatologist who told her she had an abscess and started her on ciprofloxacin. She then noticed fever and chills with continued pain in her leg. Her pain improved with advil. Her temperature had spiked to 101.3, but then resolved after tylenol. She became concerned as her abscess had enlarged to the size of a golf ball and came to the ED. She has never had history of abscesses in the past.    While at the ED T99.8, 175/111, , 20, 96% RA. She had her abscess drained in the ED, but per patient this was limited by significant pain. While at the ED she received ibuprofen, clindamycin x1 and 3 L NS. At bedside she no longer has fevers or chills. Her leg pain is well controlled. 	    Hospital Course:  Patient was admitted for left leg abscess. She had an I+D while at the emergency department. Due to concerns of purulence she was switched to vancomycin while inpatient. Her abscess culture returned HPI:  40 yo F pmhx T2DM, HTN, obesity, GERD, uterine malignancy s/p hysterectomy (Nov 2012) who presents with leg abscess. She noticed last Wednesday (about a week ago) a small dot on her leg, that appeared similar to a motley or pimple. After taking a shower she noticed that it had become open. She denied any trauma to her leg or any recent punctures to her leg. She applied neosporin and described that it became red the next day. On Friday she felt that it became firmer/harder with spreading redness. She went to her dermatologist who told her she had an abscess and started her on ciprofloxacin. She then noticed fever and chills with continued pain in her leg. Her pain improved with advil. Her temperature had spiked to 101.3, but then resolved after tylenol. She became concerned as her abscess had enlarged to the size of a golf ball and came to the ED. She has never had history of abscesses in the past.    While at the ED T99.8, 175/111, , 20, 96% RA. She had her abscess drained in the ED, but per patient this was limited by significant pain. While at the ED she received ibuprofen, clindamycin x1 and 3 L NS. At bedside she no longer has fevers or chills. Her leg pain is well controlled. 	    Hospital Course:  Patient was admitted for left leg abscess. She had an I+D while at the emergency department. Due to concerns of purulence she was switched to vancomycin while inpatient. Her abscess culture returned positive for Staph aureus (sensitivities---). Surgery was consulted HPI:  40 yo F pmhx T2DM, HTN, obesity, GERD, uterine malignancy s/p hysterectomy (Nov 2012) who presents with leg abscess. She noticed last Wednesday (about a week ago) a small dot on her leg, that appeared similar to a motley or pimple. After taking a shower she noticed that it had become open. She denied any trauma to her leg or any recent punctures to her leg. She applied neosporin and described that it became red the next day. On Friday she felt that it became firmer/harder with spreading redness. She went to her dermatologist who told her she had an abscess and started her on ciprofloxacin. She then noticed fever and chills with continued pain in her leg. Her pain improved with advil. Her temperature had spiked to 101.3, but then resolved after tylenol. She became concerned as her abscess had enlarged to the size of a golf ball and came to the ED. She has never had history of abscesses in the past.    While at the ED T99.8, 175/111, , 20, 96% RA. She had her abscess drained in the ED, but per patient this was limited by significant pain. While at the ED she received ibuprofen, clindamycin x1 and 3 L NS. At bedside she no longer has fevers or chills. Her leg pain is well controlled. 	    Hospital Course:  Patient was admitted for left leg abscess. She had an I+D while at the emergency department. Due to concerns of purulence she was switched to vancomycin while inpatient. Her abscess culture returned positive for Staph aureus (sensitivities---). Surgery was consulted and did not recommend further surgical debridement. HPI:  40 yo F pmhx T2DM, HTN, obesity, GERD, uterine malignancy s/p hysterectomy (Nov 2012) who presents with leg abscess. She noticed last Wednesday (about a week ago) a small dot on her leg, that appeared similar to a motley or pimple. After taking a shower she noticed that it had become open. She denied any trauma to her leg or any recent punctures to her leg. She applied neosporin and described that it became red the next day. On Friday she felt that it became firmer/harder with spreading redness. She went to her dermatologist who told her she had an abscess and started her on ciprofloxacin. She then noticed fever and chills with continued pain in her leg. Her pain improved with advil. Her temperature had spiked to 101.3, but then resolved after tylenol. She became concerned as her abscess had enlarged to the size of a golf ball and came to the ED. She has never had history of abscesses in the past.    While at the ED T99.8, 175/111, , 20, 96% RA. She had her abscess drained in the ED, but per patient this was limited by significant pain. While at the ED she received ibuprofen, clindamycin x1 and 3 L NS. At bedside she no longer has fevers or chills. Her leg pain is well controlled. 	    Hospital Course:  Patient was admitted for left leg abscess. She had an I+D while at the emergency department. Due to concerns of purulence she was switched to vancomycin while inpatient. Her abscess culture returned positive for Staph aureus (sensitive to oxacillin). Surgery was consulted and did not recommend further surgical debridement. Ultrasound of leg did not show further abscess. She was discharged on clindamycin with follow-up at [address] [place] [address] medicine clinic.

## 2017-09-14 NOTE — PROGRESS NOTE ADULT - SUBJECTIVE AND OBJECTIVE BOX
Patient is a 41y old  Female who presents with a chief complaint of Leg abscess (13 Sep 2017 16:33)      SUBJECTIVE / OVERNIGHT EVENTS:  No overnight events. Requested additional medication for pain control overnight (tylenol).     MEDICATIONS  (STANDING):  enoxaparin Injectable 40 milliGRAM(s) SubCutaneous every 24 hours  insulin lispro (HumaLOG) corrective regimen sliding scale   SubCutaneous three times a day before meals  insulin lispro (HumaLOG) corrective regimen sliding scale   SubCutaneous at bedtime  estradiol 0.5 milliGRAM(s) Oral daily  dextrose 5%. 1000 milliLiter(s) (50 mL/Hr) IV Continuous <Continuous>  dextrose 50% Injectable 12.5 Gram(s) IV Push once  dextrose 50% Injectable 25 Gram(s) IV Push once  dextrose 50% Injectable 25 Gram(s) IV Push once  losartan 25 milliGRAM(s) Oral daily  influenza   Vaccine 0.5 milliLiter(s) IntraMuscular once  vancomycin  IVPB 1000 milliGRAM(s) IV Intermittent every 12 hours    MEDICATIONS  (PRN):  dextrose Gel 1 Dose(s) Oral once PRN Blood Glucose LESS THAN 70 milliGRAM(s)/deciLiter  glucagon  Injectable 1 milliGRAM(s) IntraMuscular once PRN Glucose <70 milliGRAM(s)/deciLiter  acetaminophen   Tablet. 650 milliGRAM(s) Oral every 6 hours PRN Moderate Pain (4 - 6)      CAPILLARY BLOOD GLUCOSE  183 (13 Sep 2017 20:52)  120 (13 Sep 2017 16:39)        I&O's Summary    13 Sep 2017 07:01  -  14 Sep 2017 07:00  --------------------------------------------------------  IN: 990 mL / OUT: 0 mL / NET: 990 mL        PHYSICAL EXAM:  GENERAL: NAD, well-developed  HEAD:  Atraumatic, Normocephalic  CHEST/LUNG: Clear to auscultation bilaterally; No wheezes, rhonchi or gallops  HEART: Regular rate and rhythm; No murmurs, rubs, or gallops  ABDOMEN: Soft, Nontender, Nondistended; Bowel sounds present  EXTREMITIES:  2+ Peripheral Pulses, No clubbing, cyanosis, or edema  PSYCH: Appropriate  NEUROLOGY: non-focal  SKIN: L thigh wound    LABS:                        14.1   13.6  )-----------( 346      ( 13 Sep 2017 02:09 )             41.6     09-13    142  |  101  |  10  ----------------------------<  143<H>  3.6   |  23  |  0.68    Ca    10.0      13 Sep 2017 02:09    TPro  8.5<H>  /  Alb  4.9  /  TBili  0.4  /  DBili  x   /  AST  14  /  ALT  17  /  AlkPhos  59  09-13              RADIOLOGY & ADDITIONAL TESTS:    Imaging Personally Reviewed:    Consultant(s) Notes Reviewed:      Care Discussed with Consultants/Other Providers: Patient is a 41y old  Female who presents with a chief complaint of Leg abscess (13 Sep 2017 16:33)      SUBJECTIVE / OVERNIGHT EVENTS:  No overnight events. Requested additional medication for pain control overnight (tylenol). Pain improved this morning.     MEDICATIONS  (STANDING):  enoxaparin Injectable 40 milliGRAM(s) SubCutaneous every 24 hours  insulin lispro (HumaLOG) corrective regimen sliding scale   SubCutaneous three times a day before meals  insulin lispro (HumaLOG) corrective regimen sliding scale   SubCutaneous at bedtime  estradiol 0.5 milliGRAM(s) Oral daily  dextrose 5%. 1000 milliLiter(s) (50 mL/Hr) IV Continuous <Continuous>  dextrose 50% Injectable 12.5 Gram(s) IV Push once  dextrose 50% Injectable 25 Gram(s) IV Push once  dextrose 50% Injectable 25 Gram(s) IV Push once  losartan 25 milliGRAM(s) Oral daily  influenza   Vaccine 0.5 milliLiter(s) IntraMuscular once  vancomycin  IVPB 1000 milliGRAM(s) IV Intermittent every 12 hours    MEDICATIONS  (PRN):  dextrose Gel 1 Dose(s) Oral once PRN Blood Glucose LESS THAN 70 milliGRAM(s)/deciLiter  glucagon  Injectable 1 milliGRAM(s) IntraMuscular once PRN Glucose <70 milliGRAM(s)/deciLiter  acetaminophen   Tablet. 650 milliGRAM(s) Oral every 6 hours PRN Moderate Pain (4 - 6)      CAPILLARY BLOOD GLUCOSE  183 (13 Sep 2017 20:52)  120 (13 Sep 2017 16:39)    Vital Signs Last 24 Hrs  T(C): 36.4 (14 Sep 2017 05:10), Max: 37.3 (13 Sep 2017 15:30)  T(F): 97.6 (14 Sep 2017 05:10), Max: 99.1 (13 Sep 2017 15:30)  HR: 81 (14 Sep 2017 05:10) (81 - 93)  BP: 129/83 (14 Sep 2017 05:10) (129/83 - 157/92)  BP(mean): --  RR: 17 (14 Sep 2017 05:10) (17 - 18)  SpO2: 98% (14 Sep 2017 05:10) (97% - 100%)    I&O's Summary    13 Sep 2017 07:01  -  14 Sep 2017 07:00  --------------------------------------------------------  IN: 990 mL / OUT: 0 mL / NET: 990 mL      PHYSICAL EXAM:  GENERAL: NAD, well-developed  HEAD:  Atraumatic, Normocephalic  CHEST/LUNG: Clear to auscultation bilaterally; No wheezes, rhonchi or rales  HEART: Regular rate and rhythm; No murmurs, rubs, or gallops  ABDOMEN: Soft, Nontender, Nondistended; Bowel sounds present, no guarding or rigidity  EXTREMITIES:  No edema  PSYCH: Appropriate  NEUROLOGY: non-focal  SKIN: L thigh wound improved in appearance without purulent drainage. Continues to have tenderness and firmness to palpation with purple/erythematous border and ulcerative center. Packing in place.     LABS:                        14.1   13.6  )-----------( 346      ( 13 Sep 2017 02:09 )             41.6     09-13    142  |  101  |  10  ----------------------------<  143<H>  3.6   |  23  |  0.68    Ca    10.0      13 Sep 2017 02:09    TPro  8.5<H>  /  Alb  4.9  /  TBili  0.4  /  DBili  x   /  AST  14  /  ALT  17  /  AlkPhos  59  09-13        RADIOLOGY & ADDITIONAL TESTS:    Imaging Personally Reviewed:    Consultant(s) Notes Reviewed:      Care Discussed with Consultants/Other Providers: Patient is a 41y old  Female who presents with a chief complaint of Leg abscess (13 Sep 2017 16:33)      SUBJECTIVE / OVERNIGHT EVENTS:  No overnight events. Requested additional medication for pain control overnight (tylenol). Pain improved this morning. Was seen by surg in the AM. Reports improvement of abscess appearance. Denies fevers, chills.    MEDICATIONS  (STANDING):  enoxaparin Injectable 40 milliGRAM(s) SubCutaneous every 24 hours  insulin lispro (HumaLOG) corrective regimen sliding scale   SubCutaneous three times a day before meals  insulin lispro (HumaLOG) corrective regimen sliding scale   SubCutaneous at bedtime  estradiol 0.5 milliGRAM(s) Oral daily  dextrose 5%. 1000 milliLiter(s) (50 mL/Hr) IV Continuous <Continuous>  dextrose 50% Injectable 12.5 Gram(s) IV Push once  dextrose 50% Injectable 25 Gram(s) IV Push once  dextrose 50% Injectable 25 Gram(s) IV Push once  losartan 25 milliGRAM(s) Oral daily  influenza   Vaccine 0.5 milliLiter(s) IntraMuscular once  vancomycin  IVPB 1000 milliGRAM(s) IV Intermittent every 12 hours    MEDICATIONS  (PRN):  dextrose Gel 1 Dose(s) Oral once PRN Blood Glucose LESS THAN 70 milliGRAM(s)/deciLiter  glucagon  Injectable 1 milliGRAM(s) IntraMuscular once PRN Glucose <70 milliGRAM(s)/deciLiter  acetaminophen   Tablet. 650 milliGRAM(s) Oral every 6 hours PRN Moderate Pain (4 - 6)        CAPILLARY BLOOD GLUCOSE  183 (13 Sep 2017 20:52)  120 (13 Sep 2017 16:39)    Vital Signs Last 24 Hrs  T(C): 37.2 (14 Sep 2017 11:43), Max: 37.3 (13 Sep 2017 15:30)  T(F): 99 (14 Sep 2017 11:43), Max: 99.1 (13 Sep 2017 15:30)  HR: 89 (14 Sep 2017 11:43) (81 - 93)  BP: 133/77 (14 Sep 2017 11:43) (129/83 - 157/92)  BP(mean): --  RR: 18 (14 Sep 2017 11:43) (17 - 18)  SpO2: 98% (14 Sep 2017 11:43) (97% - 99%)    I&O's Summary    13 Sep 2017 07:01  -  14 Sep 2017 07:00  --------------------------------------------------------  IN: 990 mL / OUT: 0 mL / NET: 990 mL      PHYSICAL EXAM:  GENERAL: NAD, well-developed, obese  HEAD:  Atraumatic, Normocephalic  CHEST/LUNG: Clear to auscultation bilaterally; No wheezes, rhonchi or rales  HEART: Regular rate and rhythm; No murmurs, rubs, or gallops  ABDOMEN: Soft, Nontender, Nondistended; Bowel sounds present, no guarding or rigidity  EXTREMITIES:  No edema  PSYCH: Appropriate  NEUROLOGY: non-focal  SKIN: L thigh wound improved in appearance without purulent drainage. Continues to have tenderness and firmness to palpation with purple/erythematous border and ulcerative center. Packing in place.     LABS:                         11.8   7.33  )-----------( 365      ( 14 Sep 2017 07:30 )             36.5     09-14    141  |  102  |  9   ----------------------------<  125<H>  3.5   |  23  |  0.39<L>    Ca    9.0      14 Sep 2017 07:44    TPro  8.5<H>  /  Alb  4.9  /  TBili  0.4  /  DBili  x   /  AST  14  /  ALT  17  /  AlkPhos  59  09-13      Labs reviewed remarkable for IMPROVEMENT IN WBC, BLOOD CX NEG X 2, WOUND GROWING STAPH AUREUS.

## 2017-09-14 NOTE — DISCHARGE NOTE ADULT - ADDITIONAL INSTRUCTIONS
Please follow-up at clinic in the next two weeks for monitoring of your abscess and diabetes. Please take yogurts in between doses to prevent diarrhea.

## 2017-09-14 NOTE — CONSULT NOTE ADULT - SUBJECTIVE AND OBJECTIVE BOX
GENERAL SURGERY CONSULT NOTE  [id number] - Red surgery  Dr. Kieran Knox    HPI  42 yo F admitted today for L leg abscess.     PAST MEDICAL & SURGICAL HISTORY:  Diabetes mellitus  Obesity  Uterine cancer  IBS (irritable bowel syndrome)  GERD (gastroesophageal reflux disease)  Hypertension: onset 8/2012  H/O total hysterectomy  S/P colonoscopy  History of D&C: malignant uterine polyp removed 9/2012      Systemic:	[ ] Fever	[ ] Chills	[ ] Night sweats    [ ] Fatigue	[ ] Other  [] Cardiovascular:  [] Pulmonary:  [] Renal/Urologic:  [] Gastrointestinal:   [] Metabolic:  [] Neurologic:  [] Hematologic:  [] ENT:  [] Ophthalmologic:  [] Musculoskeletal:    MEDICATIONS  (STANDING):  enoxaparin Injectable 40 milliGRAM(s) SubCutaneous every 24 hours  insulin lispro (HumaLOG) corrective regimen sliding scale   SubCutaneous three times a day before meals  insulin lispro (HumaLOG) corrective regimen sliding scale   SubCutaneous at bedtime  estradiol 0.5 milliGRAM(s) Oral daily  dextrose 5%. 1000 milliLiter(s) (50 mL/Hr) IV Continuous <Continuous>  dextrose 50% Injectable 12.5 Gram(s) IV Push once  dextrose 50% Injectable 25 Gram(s) IV Push once  dextrose 50% Injectable 25 Gram(s) IV Push once  losartan 25 milliGRAM(s) Oral daily  influenza   Vaccine 0.5 milliLiter(s) IntraMuscular once  vancomycin  IVPB 1000 milliGRAM(s) IV Intermittent every 12 hours    MEDICATIONS  (PRN):  dextrose Gel 1 Dose(s) Oral once PRN Blood Glucose LESS THAN 70 milliGRAM(s)/deciLiter  glucagon  Injectable 1 milliGRAM(s) IntraMuscular once PRN Glucose <70 milliGRAM(s)/deciLiter      Allergies    penicillin (Rash)  shellfish (Rash; Anaphylaxis)    Intolerances        SOCIAL HISTORY:    FAMILY HISTORY:  Family history of rheumatoid arthritis (Mother)  Family history of lymphoma (Father): Nonhodgkin lymphoma      Vital Signs Last 24 Hrs  T(C): 36.9 (13 Sep 2017 20:28), Max: 37.3 (13 Sep 2017 07:28)  T(F): 98.4 (13 Sep 2017 20:28), Max: 99.2 (13 Sep 2017 07:28)  HR: 90 (13 Sep 2017 22:55) (90 - 103)  BP: 150/88 (13 Sep 2017 22:55) (130/86 - 159/95)  BP(mean): --  RR: 17 (13 Sep 2017 22:55) (17 - 20)  SpO2: 97% (13 Sep 2017 22:55) (97% - 100%)    PHYSICAL EXAM:    Constitutional: NAD    Eyes: anicteric    ENMT: no rhinorrhea    Neck: supple    Respiratory: Clear bilaterally, respirations not labored, no retractions    Cardiovascular: RRR, NL S1S2    Gastrointestinal: Soft, ND, NT    Genitourinary: Normal external genitalia    Rectal:    Extremities: No deformities    Vascular: Ext. warm, normal cap refill    Neurological: sensation and motor intact to all extremities    Skin: warm, dry, no rash    Lymph Nodes: no palpable adenopathy      LABS:                        14.1   13.6  )-----------( 346      ( 13 Sep 2017 02:09 )             41.6     09-13    142  |  101  |  10  ----------------------------<  143<H>  3.6   |  23  |  0.68    Ca    10.0      13 Sep 2017 02:09    TPro  8.5<H>  /  Alb  4.9  /  TBili  0.4  /  DBili  x   /  AST  14  /  ALT  17  /  AlkPhos  59  09-13          RADIOLOGY & ADDITIONAL STUDIES: GENERAL SURGERY CONSULT NOTE  p9002 - Red surgery  Dr. Kieran Knox    HPI  42 yo F with h/o diabetes admitted today for L leg abscess. Pt noticed a small pimple on her anterior Left leg approximately 1 week ago. It initially opened and drained purulent fluid, however over the past few days it has become larger, more swollen, more painful, and more erythematous. The pt reports the erythema was extending to her groin at the time of admission in the ER. The ER incised and drained the abscess. She was unsure whether they got purulent or sanguinous fluid out. She reports the pain and erythema has improved since the drainage and antibiotic treatment. She denies any prior abscesses. She also denies fevers/chills/malaise.     PAST MEDICAL & SURGICAL HISTORY:  Diabetes mellitus  Obesity  Uterine cancer  IBS (irritable bowel syndrome)  GERD (gastroesophageal reflux disease)  Hypertension: onset 8/2012    H/O total hysterectomy  S/P colonoscopy  History of D&C: malignant uterine polyp removed 9/2012      Systemic:	[ ] Fever	[ ] Chills	[ ] Night sweats    [ ] Fatigue	[ ] Other  [] Cardiovascular:  [] Pulmonary:  [] Renal/Urologic:  [] Gastrointestinal:   [] Metabolic:  [] Neurologic:  [] Hematologic:  [] ENT:  [] Ophthalmologic:  [x] Musculoskeletal: LLE pain    MEDICATIONS  (STANDING):  enoxaparin Injectable 40 milliGRAM(s) SubCutaneous every 24 hours  insulin lispro (HumaLOG) corrective regimen sliding scale   SubCutaneous three times a day before meals  insulin lispro (HumaLOG) corrective regimen sliding scale   SubCutaneous at bedtime  estradiol 0.5 milliGRAM(s) Oral daily  dextrose 5%. 1000 milliLiter(s) (50 mL/Hr) IV Continuous <Continuous>  dextrose 50% Injectable 12.5 Gram(s) IV Push once  dextrose 50% Injectable 25 Gram(s) IV Push once  dextrose 50% Injectable 25 Gram(s) IV Push once  losartan 25 milliGRAM(s) Oral daily  influenza   Vaccine 0.5 milliLiter(s) IntraMuscular once  vancomycin  IVPB 1000 milliGRAM(s) IV Intermittent every 12 hours    MEDICATIONS  (PRN):  dextrose Gel 1 Dose(s) Oral once PRN Blood Glucose LESS THAN 70 milliGRAM(s)/deciLiter  glucagon  Injectable 1 milliGRAM(s) IntraMuscular once PRN Glucose <70 milliGRAM(s)/deciLiter      Allergies    penicillin (Rash)  shellfish (Rash; Anaphylaxis)    Intolerances        SOCIAL HISTORY:    FAMILY HISTORY:  Family history of rheumatoid arthritis (Mother)  Family history of lymphoma (Father): Nonhodgkin lymphoma      Vital Signs Last 24 Hrs  T(C): 36.9 (13 Sep 2017 20:28), Max: 37.3 (13 Sep 2017 07:28)  T(F): 98.4 (13 Sep 2017 20:28), Max: 99.2 (13 Sep 2017 07:28)  HR: 90 (13 Sep 2017 22:55) (90 - 103)  BP: 150/88 (13 Sep 2017 22:55) (130/86 - 159/95)  BP(mean): --  RR: 17 (13 Sep 2017 22:55) (17 - 20)  SpO2: 97% (13 Sep 2017 22:55) (97% - 100%)    PHYSICAL EXAM:    Constitutional: NAD    Eyes: anicteric    ENMT: no rhinorrhea    Neck: supple    Respiratory: Clear bilaterally, respirations not labored, no retractions    Cardiovascular: RRR, NL S1S2    Gastrointestinal: Soft, ND, NT    Genitourinary: Normal external genitalia    Extremities: No deformities, L anterior thigh with approx 15cm area of induration and erythema with central 3 cm area of fluctuance and ulcerated skin. This was probed and a cavity was encountered. No purulent drainage.     Vascular: Ext. warm, normal cap refill    Neurological: sensation and motor intact to all extremities    Skin: warm, dry, no rash    Lymph Nodes: no palpable adenopathy      LABS:                        14.1   13.6  )-----------( 346      ( 13 Sep 2017 02:09 )             41.6     09-13    142  |  101  |  10  ----------------------------<  143<H>  3.6   |  23  |  0.68    Ca    10.0      13 Sep 2017 02:09    TPro  8.5<H>  /  Alb  4.9  /  TBili  0.4  /  DBili  x   /  AST  14  /  ALT  17  /  AlkPhos  59  09-13          RADIOLOGY & ADDITIONAL STUDIES:

## 2017-09-14 NOTE — CONSULT NOTE ADULT - ASSESSMENT
42 yo F with LLE absces (thigh), s/p Incision and drainage by ER staff. Consulted for adequacy of drainage. The wound was probed and the incision appeared to extend into the abscess cavity. The cavity was packed with 1/4 inch packing. No further debridement required at this time.     - Plan to remove the packing at 24- 48h.   - Antibiotics for cellulitis per medicine  - Pain control  - Local wound care with dry gauze  - D/W Dr. Knox

## 2017-09-14 NOTE — PROGRESS NOTE ADULT - PROBLEM SELECTOR PLAN 1
2/2 Staph aureus on culture (without bacteremia) s/p I+D at the ED, however unclear if purulence was fully expressed and exam is c/w persistent abscess. Remains hemodynamically stable  - Start vancomycin, check trough before 4th dose  - Please have RN demarcate area of cellulitis to monitor for improvement  - Consider cefepime or ciprofloxacin for pseudomonal coverage given dm2 hx if patient becomes febrile or with worsening WBC ct  - Surgery consulted for additional surgical drainage. May need u/s for further eval.  - Start ibuprofen prn for pain control 2/2 Staph aureus on culture (without bacteremia) s/p I+D at the ED, however unclear if purulence was fully expressed and exam is c/w persistent abscess. Remains hemodynamically stable  - C/W vancomycin, check trough before 4th dose  - Please have RN demarcate area of cellulitis to monitor for improvement  - Surgery consulted appreciated  - check thigh u/s to eval for further loculations/complications of abscess  - tylenol prn for pain control

## 2017-09-14 NOTE — PROGRESS NOTE ADULT - PROBLEM SELECTOR PLAN 5
- SubQ lovenox prophylaxis  - Diabetic diet    Patricia Gandhi MD  Internal Medicine, Intern  Pager [phone] - SubQ lovenox prophylaxis  - Diabetic diet  - No SW/PT needs    Patricia Gandhi MD  Internal Medicine, Intern  Pager [phone]

## 2017-09-15 VITALS
SYSTOLIC BLOOD PRESSURE: 138 MMHG | RESPIRATION RATE: 18 BRPM | DIASTOLIC BLOOD PRESSURE: 93 MMHG | HEART RATE: 98 BPM | TEMPERATURE: 99 F | OXYGEN SATURATION: 98 %

## 2017-09-15 LAB
-  AMPICILLIN/SULBACTAM: SIGNIFICANT CHANGE UP
-  CEFAZOLIN: SIGNIFICANT CHANGE UP
-  CIPROFLOXACIN: SIGNIFICANT CHANGE UP
-  CLINDAMYCIN: SIGNIFICANT CHANGE UP
-  ERYTHROMYCIN: SIGNIFICANT CHANGE UP
-  GENTAMICIN: SIGNIFICANT CHANGE UP
-  LEVOFLOXACIN: SIGNIFICANT CHANGE UP
-  MOXIFLOXACIN(AEROBIC): SIGNIFICANT CHANGE UP
-  OXACILLIN: SIGNIFICANT CHANGE UP
-  RIFAMPIN: SIGNIFICANT CHANGE UP
-  TETRACYCLINE: SIGNIFICANT CHANGE UP
-  TRIMETHOPRIM/SULFAMETHOXAZOLE: SIGNIFICANT CHANGE UP
-  VANCOMYCIN: SIGNIFICANT CHANGE UP
ANION GAP SERPL CALC-SCNC: 18 MMOL/L — HIGH (ref 5–17)
BUN SERPL-MCNC: 11 MG/DL — SIGNIFICANT CHANGE UP (ref 7–23)
CALCIUM SERPL-MCNC: 9.5 MG/DL — SIGNIFICANT CHANGE UP (ref 8.4–10.5)
CHLORIDE SERPL-SCNC: 103 MMOL/L — SIGNIFICANT CHANGE UP (ref 96–108)
CO2 SERPL-SCNC: 22 MMOL/L — SIGNIFICANT CHANGE UP (ref 22–31)
CREAT SERPL-MCNC: 0.65 MG/DL — SIGNIFICANT CHANGE UP (ref 0.5–1.3)
GLUCOSE SERPL-MCNC: 149 MG/DL — HIGH (ref 70–99)
MAGNESIUM SERPL-MCNC: 2.1 MG/DL — SIGNIFICANT CHANGE UP (ref 1.6–2.6)
METHOD TYPE: SIGNIFICANT CHANGE UP
PHOSPHATE SERPL-MCNC: 3.9 MG/DL — SIGNIFICANT CHANGE UP (ref 2.5–4.5)
POTASSIUM SERPL-MCNC: 3.5 MMOL/L — SIGNIFICANT CHANGE UP (ref 3.5–5.3)
POTASSIUM SERPL-SCNC: 3.5 MMOL/L — SIGNIFICANT CHANGE UP (ref 3.5–5.3)
SODIUM SERPL-SCNC: 143 MMOL/L — SIGNIFICANT CHANGE UP (ref 135–145)
VANCOMYCIN TROUGH SERPL-MCNC: 3.8 UG/ML — LOW (ref 10–20)

## 2017-09-15 PROCEDURE — 87070 CULTURE OTHR SPECIMN AEROBIC: CPT

## 2017-09-15 PROCEDURE — 85014 HEMATOCRIT: CPT

## 2017-09-15 PROCEDURE — 87040 BLOOD CULTURE FOR BACTERIA: CPT

## 2017-09-15 PROCEDURE — 82947 ASSAY GLUCOSE BLOOD QUANT: CPT

## 2017-09-15 PROCEDURE — 84100 ASSAY OF PHOSPHORUS: CPT

## 2017-09-15 PROCEDURE — 99239 HOSP IP/OBS DSCHRG MGMT >30: CPT

## 2017-09-15 PROCEDURE — 76882 US LMTD JT/FCL EVL NVASC XTR: CPT

## 2017-09-15 PROCEDURE — 99285 EMERGENCY DEPT VISIT HI MDM: CPT | Mod: 25

## 2017-09-15 PROCEDURE — 82330 ASSAY OF CALCIUM: CPT

## 2017-09-15 PROCEDURE — 80202 ASSAY OF VANCOMYCIN: CPT

## 2017-09-15 PROCEDURE — 10061 I&D ABSCESS COMP/MULTIPLE: CPT

## 2017-09-15 PROCEDURE — 83605 ASSAY OF LACTIC ACID: CPT

## 2017-09-15 PROCEDURE — 84295 ASSAY OF SERUM SODIUM: CPT

## 2017-09-15 PROCEDURE — 80048 BASIC METABOLIC PNL TOTAL CA: CPT

## 2017-09-15 PROCEDURE — 83735 ASSAY OF MAGNESIUM: CPT

## 2017-09-15 PROCEDURE — 85027 COMPLETE CBC AUTOMATED: CPT

## 2017-09-15 PROCEDURE — 83036 HEMOGLOBIN GLYCOSYLATED A1C: CPT

## 2017-09-15 PROCEDURE — 82803 BLOOD GASES ANY COMBINATION: CPT

## 2017-09-15 PROCEDURE — 80053 COMPREHEN METABOLIC PANEL: CPT

## 2017-09-15 PROCEDURE — 86803 HEPATITIS C AB TEST: CPT

## 2017-09-15 PROCEDURE — 87389 HIV-1 AG W/HIV-1&-2 AB AG IA: CPT

## 2017-09-15 PROCEDURE — 84132 ASSAY OF SERUM POTASSIUM: CPT

## 2017-09-15 PROCEDURE — 87205 SMEAR GRAM STAIN: CPT

## 2017-09-15 PROCEDURE — 87186 SC STD MICRODIL/AGAR DIL: CPT

## 2017-09-15 PROCEDURE — 82435 ASSAY OF BLOOD CHLORIDE: CPT

## 2017-09-15 RX ORDER — ACETAMINOPHEN 500 MG
2 TABLET ORAL
Qty: 0 | Refills: 0 | COMMUNITY
Start: 2017-09-15

## 2017-09-15 RX ADMIN — Medication 450 MILLIGRAM(S): at 16:59

## 2017-09-15 RX ADMIN — LOSARTAN POTASSIUM 25 MILLIGRAM(S): 100 TABLET, FILM COATED ORAL at 06:34

## 2017-09-15 RX ADMIN — Medication 250 MILLIGRAM(S): at 06:43

## 2017-09-15 RX ADMIN — Medication 0.5 MILLIGRAM(S): at 12:52

## 2017-09-15 RX ADMIN — Medication 1: at 10:24

## 2017-09-15 NOTE — PROGRESS NOTE ADULT - PROBLEM SELECTOR PLAN 1
2/2 Staph aureus on culture (without bacteremia) s/p I+D at the ED, w/o persistent abscess on ultrasound. Remains hemodynamically stable  - Switching to clindamycin today  - Surgery consulted appreciated  - Ibuprofen prn for pain control 2/2 Staph aureus on culture (without bacteremia) s/p I+D at the ED, w/o persistent abscess on ultrasound. Remains hemodynamically stable and clinically improved  - Switching to clindamycin oral today for d/c  - Surgery consulted appreciated  - Ibuprofen prn for pain control

## 2017-09-15 NOTE — PROGRESS NOTE ADULT - PROBLEM SELECTOR PLAN 3
A1c 6.6, on metformin at home  - Continue fingersticks  - Sliding scale insulin  - Hold home metformin

## 2017-09-15 NOTE — PROGRESS NOTE ADULT - PROBLEM SELECTOR PLAN 5
- SubQ lovenox prophylaxis  - Diabetic diet  - No SW/PT needs    Patricia Gandhi MD  Internal Medicine, Intern  Pager [phone]

## 2017-09-15 NOTE — PROGRESS NOTE ADULT - SUBJECTIVE AND OBJECTIVE BOX
Patient is a 41y old  Female who presents with a chief complaint of Leg abscess (14 Sep 2017 14:22)      SUBJECTIVE / OVERNIGHT EVENTS:  No overnight events. Improved pain at abscess site. No fevers. No other symptoms.     MEDICATIONS  (STANDING):  enoxaparin Injectable 40 milliGRAM(s) SubCutaneous every 24 hours  insulin lispro (HumaLOG) corrective regimen sliding scale   SubCutaneous three times a day before meals  insulin lispro (HumaLOG) corrective regimen sliding scale   SubCutaneous at bedtime  estradiol 0.5 milliGRAM(s) Oral daily  dextrose 5%. 1000 milliLiter(s) (50 mL/Hr) IV Continuous <Continuous>  dextrose 50% Injectable 12.5 Gram(s) IV Push once  dextrose 50% Injectable 25 Gram(s) IV Push once  dextrose 50% Injectable 25 Gram(s) IV Push once  losartan 25 milliGRAM(s) Oral daily  influenza   Vaccine 0.5 milliLiter(s) IntraMuscular once  vancomycin  IVPB 1000 milliGRAM(s) IV Intermittent every 12 hours    MEDICATIONS  (PRN):  dextrose Gel 1 Dose(s) Oral once PRN Blood Glucose LESS THAN 70 milliGRAM(s)/deciLiter  glucagon  Injectable 1 milliGRAM(s) IntraMuscular once PRN Glucose <70 milliGRAM(s)/deciLiter  acetaminophen   Tablet. 650 milliGRAM(s) Oral every 6 hours PRN Moderate Pain (4 - 6)      CAPILLARY BLOOD GLUCOSE  151 (15 Sep 2017 08:48)  149 (14 Sep 2017 21:18)  130 (14 Sep 2017 19:13)  182 (14 Sep 2017 16:29)  108 (14 Sep 2017 12:25)        I&O's Summary    14 Sep 2017 07:01  -  15 Sep 2017 07:00  --------------------------------------------------------  IN: 970 mL / OUT: 0 mL / NET: 970 mL    Vital Signs Last 24 Hrs  T(C): 36.9 (15 Sep 2017 05:09), Max: 37.2 (14 Sep 2017 11:43)  T(F): 98.4 (15 Sep 2017 05:09), Max: 99 (14 Sep 2017 11:43)  HR: 84 (15 Sep 2017 05:09) (83 - 89)  BP: 120/75 (15 Sep 2017 05:09) (120/75 - 138/93)  BP(mean): --  RR: 17 (15 Sep 2017 05:09) (17 - 18)  SpO2: 96% (15 Sep 2017 05:09) (96% - 98%)    PHYSICAL EXAM:  GENERAL: NAD, well-developed  HEAD:  Atraumatic, Normocephalic  CHEST/LUNG: Clear to auscultation bilaterally; No wheezes, rhonchi or rales  HEART: Regular rate and rhythm; No murmurs, rubs, or gallops  ABDOMEN: Soft, Nontender, Nondistended; Bowel sounds present  EXTREMITIES:  No edema  PSYCH: Appropriate  NEUROLOGY: non-focal  SKIN: Improved appearance of abscess, continues to have purple border with ulcerative area in center    LABS:                        11.8   7.33  )-----------( 365      ( 14 Sep 2017 07:30 )             36.5     09-15    143  |  103  |  11  ----------------------------<  149<H>  3.5   |  22  |  0.65    Ca    9.5      15 Sep 2017 09:03  Phos  3.9     09-15  Mg     2.1     09-15 Patient is a 41y old  Female who presents with a chief complaint of Leg abscess (14 Sep 2017 14:22)      SUBJECTIVE / OVERNIGHT EVENTS:  No overnight events. Improved pain at abscess site. No fevers. No other symptoms.     MEDICATIONS  (STANDING):  enoxaparin Injectable 40 milliGRAM(s) SubCutaneous every 24 hours  insulin lispro (HumaLOG) corrective regimen sliding scale   SubCutaneous three times a day before meals  insulin lispro (HumaLOG) corrective regimen sliding scale   SubCutaneous at bedtime  estradiol 0.5 milliGRAM(s) Oral daily  dextrose 5%. 1000 milliLiter(s) (50 mL/Hr) IV Continuous <Continuous>  dextrose 50% Injectable 12.5 Gram(s) IV Push once  dextrose 50% Injectable 25 Gram(s) IV Push once  dextrose 50% Injectable 25 Gram(s) IV Push once  losartan 25 milliGRAM(s) Oral daily  influenza   Vaccine 0.5 milliLiter(s) IntraMuscular once  vancomycin  IVPB 1000 milliGRAM(s) IV Intermittent every 12 hours    MEDICATIONS  (PRN):  dextrose Gel 1 Dose(s) Oral once PRN Blood Glucose LESS THAN 70 milliGRAM(s)/deciLiter  glucagon  Injectable 1 milliGRAM(s) IntraMuscular once PRN Glucose <70 milliGRAM(s)/deciLiter  acetaminophen   Tablet. 650 milliGRAM(s) Oral every 6 hours PRN Moderate Pain (4 - 6)      CAPILLARY BLOOD GLUCOSE  151 (15 Sep 2017 08:48)  149 (14 Sep 2017 21:18)  130 (14 Sep 2017 19:13)  182 (14 Sep 2017 16:29)  108 (14 Sep 2017 12:25)        I&O's Summary    14 Sep 2017 07:01  -  15 Sep 2017 07:00  --------------------------------------------------------  IN: 970 mL / OUT: 0 mL / NET: 970 mL    Vital Signs Last 24 Hrs  T(C): 36.9 (15 Sep 2017 05:09), Max: 37.2 (14 Sep 2017 11:43)  T(F): 98.4 (15 Sep 2017 05:09), Max: 99 (14 Sep 2017 11:43)  HR: 84 (15 Sep 2017 05:09) (83 - 89)  BP: 120/75 (15 Sep 2017 05:09) (120/75 - 138/93)  BP(mean): --  RR: 17 (15 Sep 2017 05:09) (17 - 18)  SpO2: 96% (15 Sep 2017 05:09) (96% - 98%)    PHYSICAL EXAM:  GENERAL: NAD, well-developed, obese  HEAD:  Atraumatic, Normocephalic  CHEST/LUNG: Clear to auscultation bilaterally; No wheezes, rhonchi or rales  HEART: Regular rate and rhythm; No murmurs, rubs, or gallops  ABDOMEN: Soft, Nontender, Nondistended; Bowel sounds present, no guarding or rigidity  EXTREMITIES:  No edema  PSYCH: Appropriate  NEUROLOGY: non-focal  SKIN: L thigh wound improved appearance without purulent drainage. Continues to have tenderness and firmness to palpation with purple/erythematous border and ulcerative center.     LABS:                         11.8   7.33  )-----------( 365      ( 14 Sep 2017 07:30 )             36.5     09-15    143  |  103  |  11  ----------------------------<  149<H>  3.5   |  22  |  0.65    Ca    9.5      15 Sep 2017 09:03  Phos  3.9     09-15  Mg     2.1     09-15    Labs reviewed.  U/S OF LEFT THIGH reviewed - no loculation, no abscess seen.

## 2017-09-15 NOTE — PROGRESS NOTE ADULT - ASSESSMENT
40 yo F pmhx T2DM, HTN, obesity, GERD, uterine malignancy s/p hysterectomy (Nov 2012) who presents with left thigh pain/induration admitted for sepsis due to abscess/cellulitis s/p i&d. 42 yo F pmhx T2DM, HTN, obesity, GERD, uterine malignancy s/p hysterectomy (Nov 2012) who presents with left thigh pain/induration admitted for sepsis due to abscess/cellulitis s/p i&d with improvement.

## 2017-09-15 NOTE — PROGRESS NOTE ADULT - PROBLEM SELECTOR PLAN 2
- Continue home losartan 25 mg daily  - Hold metoprolol for now as not to complicate sepsis picture
- Continue home losartan 25 mg daily  - Hold metoprolol for now as not to complicate sepsis picture  - Can restart home metoprolol on discharge

## 2017-09-18 PROBLEM — E11.9 TYPE 2 DIABETES MELLITUS WITHOUT COMPLICATIONS: Chronic | Status: ACTIVE | Noted: 2017-09-12

## 2017-09-18 LAB
CULTURE RESULTS: SIGNIFICANT CHANGE UP
ORGANISM # SPEC MICROSCOPIC CNT: SIGNIFICANT CHANGE UP
ORGANISM # SPEC MICROSCOPIC CNT: SIGNIFICANT CHANGE UP
SPECIMEN SOURCE: SIGNIFICANT CHANGE UP

## 2017-09-27 ENCOUNTER — OUTPATIENT (OUTPATIENT)
Dept: OUTPATIENT SERVICES | Facility: HOSPITAL | Age: 42
LOS: 1 days | End: 2017-09-27
Payer: MEDICAID

## 2017-09-27 ENCOUNTER — APPOINTMENT (OUTPATIENT)
Dept: INTERNAL MEDICINE | Facility: CLINIC | Age: 42
End: 2017-09-27
Payer: MEDICAID

## 2017-09-27 VITALS
SYSTOLIC BLOOD PRESSURE: 138 MMHG | OXYGEN SATURATION: 98 % | BODY MASS INDEX: 38.99 KG/M2 | HEIGHT: 65 IN | WEIGHT: 234 LBS | DIASTOLIC BLOOD PRESSURE: 90 MMHG | HEART RATE: 94 BPM

## 2017-09-27 DIAGNOSIS — Z90.710 ACQUIRED ABSENCE OF BOTH CERVIX AND UTERUS: Chronic | ICD-10-CM

## 2017-09-27 PROCEDURE — 99214 OFFICE O/P EST MOD 30 MIN: CPT | Mod: GC

## 2017-09-27 PROCEDURE — G0463: CPT

## 2017-09-27 RX ORDER — METFORMIN HYDROCHLORIDE 1000 MG/1
1000 TABLET, COATED ORAL
Qty: 60 | Refills: 5 | Status: DISCONTINUED | COMMUNITY
Start: 2017-07-07 | End: 2017-09-27

## 2017-09-28 DIAGNOSIS — I10 ESSENTIAL (PRIMARY) HYPERTENSION: ICD-10-CM

## 2017-10-02 ENCOUNTER — APPOINTMENT (OUTPATIENT)
Dept: WOUND CARE | Facility: CLINIC | Age: 42
End: 2017-10-02
Payer: MEDICAID

## 2017-10-02 VITALS
TEMPERATURE: 97.6 F | WEIGHT: 234 LBS | HEART RATE: 74 BPM | HEIGHT: 65 IN | BODY MASS INDEX: 38.99 KG/M2 | DIASTOLIC BLOOD PRESSURE: 78 MMHG | SYSTOLIC BLOOD PRESSURE: 140 MMHG | RESPIRATION RATE: 14 BRPM

## 2017-10-02 PROCEDURE — 11042 DBRDMT SUBQ TIS 1ST 20SQCM/<: CPT

## 2017-10-02 PROCEDURE — 99204 OFFICE O/P NEW MOD 45 MIN: CPT | Mod: 25

## 2017-10-06 DIAGNOSIS — L02.91 CUTANEOUS ABSCESS, UNSPECIFIED: ICD-10-CM

## 2017-10-06 DIAGNOSIS — J32.9 CHRONIC SINUSITIS, UNSPECIFIED: ICD-10-CM

## 2017-10-16 ENCOUNTER — APPOINTMENT (OUTPATIENT)
Dept: WOUND CARE | Facility: CLINIC | Age: 42
End: 2017-10-16
Payer: MEDICAID

## 2017-10-16 VITALS
HEIGHT: 65 IN | TEMPERATURE: 98.6 F | RESPIRATION RATE: 14 BRPM | HEART RATE: 85 BPM | DIASTOLIC BLOOD PRESSURE: 96 MMHG | WEIGHT: 234 LBS | BODY MASS INDEX: 38.99 KG/M2 | SYSTOLIC BLOOD PRESSURE: 145 MMHG

## 2017-10-16 DIAGNOSIS — S71.102A UNSPECIFIED OPEN WOUND, LEFT THIGH, INITIAL ENCOUNTER: ICD-10-CM

## 2017-10-16 PROCEDURE — 99213 OFFICE O/P EST LOW 20 MIN: CPT

## 2017-10-23 ENCOUNTER — OUTPATIENT (OUTPATIENT)
Dept: OUTPATIENT SERVICES | Facility: HOSPITAL | Age: 42
LOS: 1 days | End: 2017-10-23
Payer: MEDICAID

## 2017-10-23 ENCOUNTER — APPOINTMENT (OUTPATIENT)
Dept: OBGYN | Facility: CLINIC | Age: 42
End: 2017-10-23
Payer: MEDICAID

## 2017-10-23 VITALS — SYSTOLIC BLOOD PRESSURE: 128 MMHG | DIASTOLIC BLOOD PRESSURE: 84 MMHG | BODY MASS INDEX: 39.61 KG/M2 | WEIGHT: 238 LBS

## 2017-10-23 DIAGNOSIS — Z78.0 ASYMPTOMATIC MENOPAUSAL STATE: ICD-10-CM

## 2017-10-23 DIAGNOSIS — Z90.710 ACQUIRED ABSENCE OF BOTH CERVIX AND UTERUS: Chronic | ICD-10-CM

## 2017-10-23 DIAGNOSIS — N76.0 ACUTE VAGINITIS: ICD-10-CM

## 2017-10-23 DIAGNOSIS — Z01.419 ENCOUNTER FOR GYNECOLOGICAL EXAMINATION (GENERAL) (ROUTINE) WITHOUT ABNORMAL FINDINGS: ICD-10-CM

## 2017-10-23 PROCEDURE — G0463: CPT

## 2017-10-23 PROCEDURE — 99396 PREV VISIT EST AGE 40-64: CPT | Mod: GC

## 2017-10-30 ENCOUNTER — APPOINTMENT (OUTPATIENT)
Dept: INTERNAL MEDICINE | Facility: CLINIC | Age: 42
End: 2017-10-30
Payer: MEDICAID

## 2017-10-30 ENCOUNTER — OUTPATIENT (OUTPATIENT)
Dept: OUTPATIENT SERVICES | Facility: HOSPITAL | Age: 42
LOS: 1 days | End: 2017-10-30
Payer: MEDICAID

## 2017-10-30 ENCOUNTER — APPOINTMENT (OUTPATIENT)
Dept: WOUND CARE | Facility: CLINIC | Age: 42
End: 2017-10-30
Payer: MEDICAID

## 2017-10-30 VITALS
TEMPERATURE: 98.4 F | DIASTOLIC BLOOD PRESSURE: 89 MMHG | SYSTOLIC BLOOD PRESSURE: 140 MMHG | HEART RATE: 79 BPM | RESPIRATION RATE: 16 BRPM

## 2017-10-30 VITALS
WEIGHT: 238 LBS | DIASTOLIC BLOOD PRESSURE: 80 MMHG | HEIGHT: 65 IN | BODY MASS INDEX: 39.65 KG/M2 | SYSTOLIC BLOOD PRESSURE: 140 MMHG

## 2017-10-30 DIAGNOSIS — I10 ESSENTIAL (PRIMARY) HYPERTENSION: ICD-10-CM

## 2017-10-30 DIAGNOSIS — L02.91 CUTANEOUS ABSCESS, UNSPECIFIED: ICD-10-CM

## 2017-10-30 DIAGNOSIS — Z90.710 ACQUIRED ABSENCE OF BOTH CERVIX AND UTERUS: Chronic | ICD-10-CM

## 2017-10-30 DIAGNOSIS — T14.90XA INJURY, UNSPECIFIED, INITIAL ENCOUNTER: ICD-10-CM

## 2017-10-30 PROCEDURE — 90688 IIV4 VACCINE SPLT 0.5 ML IM: CPT

## 2017-10-30 PROCEDURE — G0008: CPT

## 2017-10-30 PROCEDURE — G0463: CPT

## 2017-10-30 PROCEDURE — 99213 OFFICE O/P EST LOW 20 MIN: CPT | Mod: GE

## 2017-10-30 PROCEDURE — 99213 OFFICE O/P EST LOW 20 MIN: CPT

## 2017-10-30 RX ORDER — FLUTICASONE PROPIONATE 50 UG/1
50 SPRAY, METERED NASAL DAILY
Qty: 1 | Refills: 2 | Status: DISCONTINUED | COMMUNITY
Start: 2017-09-27 | End: 2017-10-30

## 2017-11-01 PROBLEM — L02.91 ABSCESS: Status: ACTIVE | Noted: 2017-09-27

## 2017-11-03 DIAGNOSIS — L02.91 CUTANEOUS ABSCESS, UNSPECIFIED: ICD-10-CM

## 2017-11-03 DIAGNOSIS — Z23 ENCOUNTER FOR IMMUNIZATION: ICD-10-CM

## 2017-11-04 PROBLEM — T14.90XA CLOSED WOUND: Status: ACTIVE | Noted: 2017-11-04

## 2017-11-10 ENCOUNTER — TRANSCRIPTION ENCOUNTER (OUTPATIENT)
Age: 42
End: 2017-11-10

## 2017-11-19 ENCOUNTER — FORM ENCOUNTER (OUTPATIENT)
Age: 42
End: 2017-11-19

## 2017-11-20 ENCOUNTER — OUTPATIENT (OUTPATIENT)
Dept: OUTPATIENT SERVICES | Facility: HOSPITAL | Age: 42
LOS: 1 days | End: 2017-11-20
Payer: MEDICAID

## 2017-11-20 ENCOUNTER — APPOINTMENT (OUTPATIENT)
Dept: MAMMOGRAPHY | Facility: IMAGING CENTER | Age: 42
End: 2017-11-20
Payer: MEDICAID

## 2017-11-20 DIAGNOSIS — Z78.0 ASYMPTOMATIC MENOPAUSAL STATE: ICD-10-CM

## 2017-11-20 DIAGNOSIS — Z90.710 ACQUIRED ABSENCE OF BOTH CERVIX AND UTERUS: Chronic | ICD-10-CM

## 2017-11-20 PROCEDURE — G0202: CPT | Mod: 26

## 2017-11-20 PROCEDURE — 77063 BREAST TOMOSYNTHESIS BI: CPT

## 2017-11-20 PROCEDURE — 77063 BREAST TOMOSYNTHESIS BI: CPT | Mod: 26

## 2017-11-20 PROCEDURE — 77067 SCR MAMMO BI INCL CAD: CPT

## 2018-06-01 ENCOUNTER — APPOINTMENT (OUTPATIENT)
Dept: INTERNAL MEDICINE | Facility: CLINIC | Age: 43
End: 2018-06-01
Payer: MEDICAID

## 2018-06-01 ENCOUNTER — OUTPATIENT (OUTPATIENT)
Dept: OUTPATIENT SERVICES | Facility: HOSPITAL | Age: 43
LOS: 1 days | End: 2018-06-01
Payer: MEDICAID

## 2018-06-01 VITALS
HEIGHT: 65 IN | HEART RATE: 69 BPM | SYSTOLIC BLOOD PRESSURE: 174 MMHG | DIASTOLIC BLOOD PRESSURE: 98 MMHG | WEIGHT: 229 LBS | OXYGEN SATURATION: 99 % | BODY MASS INDEX: 38.15 KG/M2

## 2018-06-01 DIAGNOSIS — I10 ESSENTIAL (PRIMARY) HYPERTENSION: ICD-10-CM

## 2018-06-01 DIAGNOSIS — Z87.891 PERSONAL HISTORY OF NICOTINE DEPENDENCE: ICD-10-CM

## 2018-06-01 DIAGNOSIS — Z87.898 PERSONAL HISTORY OF OTHER SPECIFIED CONDITIONS: ICD-10-CM

## 2018-06-01 DIAGNOSIS — Z78.9 OTHER SPECIFIED HEALTH STATUS: ICD-10-CM

## 2018-06-01 DIAGNOSIS — T88.7XXA UNSPECIFIED ADVERSE EFFECT OF DRUG OR MEDICAMENT, INITIAL ENCOUNTER: ICD-10-CM

## 2018-06-01 DIAGNOSIS — Z90.710 ACQUIRED ABSENCE OF BOTH CERVIX AND UTERUS: Chronic | ICD-10-CM

## 2018-06-01 PROCEDURE — 99213 OFFICE O/P EST LOW 20 MIN: CPT | Mod: GE

## 2018-06-01 PROCEDURE — G0463: CPT

## 2018-06-15 DIAGNOSIS — Z79.899 OTHER LONG TERM (CURRENT) DRUG THERAPY: ICD-10-CM

## 2018-06-15 DIAGNOSIS — E11.9 TYPE 2 DIABETES MELLITUS WITHOUT COMPLICATIONS: ICD-10-CM

## 2018-06-15 NOTE — PHYSICAL EXAM
[No Acute Distress] : no acute distress [Well Nourished] : well nourished [Well Developed] : well developed [Well-Appearing] : well-appearing [Normal Sclera/Conjunctiva] : normal sclera/conjunctiva [No Lymphadenopathy] : no lymphadenopathy [Thyroid Normal, No Nodules] : the thyroid was normal and there were no nodules present [No Respiratory Distress] : no respiratory distress  [Clear to Auscultation] : lungs were clear to auscultation bilaterally [No Accessory Muscle Use] : no accessory muscle use [Normal Rate] : normal rate  [Regular Rhythm] : with a regular rhythm [Normal S1, S2] : normal S1 and S2 [No Murmur] : no murmur heard [No Edema] : there was no peripheral edema [Normal Appearance] : normal in appearance [No Nipple Discharge] : no nipple discharge [No Axillary Lymphadenopathy] : no axillary lymphadenopathy [Soft] : abdomen soft [Non Tender] : non-tender [Non-distended] : non-distended [No Masses] : no abdominal mass palpated [Normal Supraclavicular Nodes] : no supraclavicular lymphadenopathy [Normal Axillary Nodes] : no axillary lymphadenopathy [Normal Posterior Cervical Nodes] : no posterior cervical lymphadenopathy [Normal Femoral Nodes] : no femoral lymphadenopathy [Normal Anterior Cervical Nodes] : no anterior cervical lymphadenopathy [Normal Inguinal Nodes] : no inguinal lymphadenopathy [No Spinal Tenderness] : no spinal tenderness [No Joint Swelling] : no joint swelling [Normal Affect] : the affect was normal [Normal Mood] : the mood was normal [de-identified] : Mildly enlarged tonsils [de-identified] : Limited abdominal exam due to weight [de-identified] : Rash on hands and extensor surfaces of arms [de-identified] : Normal appearance without ulcers, sensation intact under toes and in dorsal web

## 2018-06-15 NOTE — HISTORY OF PRESENT ILLNESS
[de-identified] : 43 yo F pmhx Stage I endometrial cancer s/p COURT-BSO (2012, previously on estrogen), HTN, T2DM (HgA1c July 2017, 6.8%), latent TB s/p 9 month INH, chronic sinusitis, lumbar spine L3-L4 herniation (1/2016), abscess of thigh (Sept 2017, [facility]), who presents for CPE. \par \par Interval: Stopped estrogen. Has had loose stools and eczema. Had uncomfortable hot flashes. Started effexor a few weeks ago. Has not lessened them, feels more used to them. Starts to sweat randomly. Giving it a 2 month trial. Currently on 75 mg. \par \par Worried in past about high heart rate, but never felt tired or palpitations. Has been on 12.5 BID.\par \par Lesion now has healed over. No health concerns today. Has not been able to get a glucometer. Had not fasted today, had coffee today.  \par \par # Diabetes: On metformin and losartan. No numbness or tingling in feet. Sometimes at end of the week will have numbness. No bubbly urine. No ulcers on feet or scabs. \par \par Grandmother had colon cancer in age of 60s, nothing was abnormal. Had colonoscopy for inflammatory bowel disease. Reported it was negative. Was on medication for 6 months. Felt that this was stress related. Has not had bowel issues. Had severe pain in stomach. Was stabbing. No ulcers, but had gastritis. Has not had any problems in at least ten years. Now feels like moving bowels more frequently from stress. No pain. Feels like she is eating and running to bathroom immediately.

## 2018-06-15 NOTE — REVIEW OF SYSTEMS
[Fatigue] : fatigue [Hot Flashes] : hot flashes [Itching] : itching [Joint Pain] : joint pain [Muscle Pain] : muscle pain [Fever] : no fever [Chills] : no chills [Vision Problems] : no vision problems [Hearing Loss] : no hearing loss [Sore Throat] : no sore throat [Chest Pain] : no chest pain [Palpitations] : no palpitations [Lower Ext Edema] : no lower extremity edema [Shortness Of Breath] : no shortness of breath [Wheezing] : no wheezing [Cough] : no cough [Dyspnea on Exertion] : no dyspnea on exertion [Nausea] : no nausea [Vomiting] : no vomiting [Melena] : no melena [Dysuria] : no dysuria [Hematuria] : no hematuria [Headache] : no headache [Easy Bleeding] : no easy bleeding [Easy Bruising] : no easy bruising [FreeTextEntry2] : Hot flashes, 5 times a day (sometimes less or more), weight has been about the same [FreeTextEntry7] : Little nausea from irritable bowel from stress, continues to have loose stools, about 2-4 times a day [FreeTextEntry9] : Feels that this is work related [de-identified] : Rashes under arms, uses corticosteroid sparingly, sometimes itchy [de-identified] : Sometimes lightheaded from not drinking water, tries to carry water with her. [de-identified] : PHQ-2, feels stress from work (financial)

## 2018-06-15 NOTE — PLAN
[FreeTextEntry1] : 43 yo F pmhx Stage I endometrial cancer s/p COURT-BSO (2012, no longer on estrogen), HTN, T2DM (HgA1c July 2017, 6.8%), latent TB s/p 9 month INH, chronic sinusitis, lumbar spine L3-L4 herniation (1/2016), abscess of thigh (Sept 2017, [facility]), who presents for CPE. \par \par # HTN: 174 SBP on initial presentation, on repeat was 132/78 with large cuff. Reports BP of 136-138/82 at home. Given her history of diabetes (cardiovascular risk factor) would be inclined to have stricter blood pressure control (120/90).\par - She felt that she rushed before she came in today. \par - Plan to continue to encourage weight loss and diet for now. In next visit if persistently elevated, may consider increasing dose of losartan. \par \par # History of tachycardia: Asymptomatic currently. No chest pain or palpitations, however has history of diabetes (possible association with beta blockers)\par - Plan to decrease to one pill daily for next 2-3 weeks and to stop thereafter. \par - Advised to continue original dose if feels palpitations or symptomatic\par - Will need HR check and BP check in 5 weeks\par \par # Loose stools: Patient reports history of irritable bowel disease. Likely related to her stress. \par - Continue fiber and relaxation techniques\par - Hold off GI referral, unless bowel habits change or loose stools/diarrhea\par \par # Obesity (BMI 38.11): Improvement of weight since last visit. \par - Encouraged continued exercise and diet control\par \par # Diabetes:\par - Microalbumin: Cr ratio today\par - Foot examination without evidence of ulcer of loss of sensation\par - Opthalmology referral today\par - Unable to perform in office POCT HgA1c (no cartridges available), sending with whole blood today\par - Pneumovax up to date \par \par HCM:\par Pap: Has not had pap smear. Goes to gynecologist in November. Had cervix removed. \par Mammo: BIRADS 2 - November 2017\par Colonoscopy: in 2008 \par HCV/HIV negative\par Depression screening: PHQ-2 was negative\par Vaccines: Pneumovax (2016), TDAP (2012)\par \par - Lipid/CBC/CMP\par \par D/w Dr. Barnes\par RTC in 5 weeks (for HR check and blood pressure check)

## 2018-07-02 LAB
ALBUMIN SERPL ELPH-MCNC: 4.9 G/DL
ALP BLD-CCNC: 54 U/L
ALT SERPL-CCNC: 43 U/L
ANION GAP SERPL CALC-SCNC: 18 MMOL/L
AST SERPL-CCNC: 28 U/L
BILIRUB SERPL-MCNC: 0.3 MG/DL
BUN SERPL-MCNC: 14 MG/DL
CALCIUM SERPL-MCNC: 10.6 MG/DL
CHLORIDE SERPL-SCNC: 100 MMOL/L
CHOLEST SERPL-MCNC: 163 MG/DL
CHOLEST/HDLC SERPL: 3.1 RATIO
CO2 SERPL-SCNC: 25 MMOL/L
CREAT SERPL-MCNC: 0.75 MG/DL
GLUCOSE SERPL-MCNC: 131 MG/DL
HBA1C MFR BLD HPLC: 7.6 %
HDLC SERPL-MCNC: 53 MG/DL
LDLC SERPL CALC-MCNC: 73 MG/DL
POTASSIUM SERPL-SCNC: 4.2 MMOL/L
PROT SERPL-MCNC: 8.5 G/DL
SODIUM SERPL-SCNC: 143 MMOL/L
TRIGL SERPL-MCNC: 187 MG/DL

## 2018-07-06 ENCOUNTER — OUTPATIENT (OUTPATIENT)
Dept: OUTPATIENT SERVICES | Facility: HOSPITAL | Age: 43
LOS: 1 days | End: 2018-07-06
Payer: MEDICAID

## 2018-07-06 ENCOUNTER — APPOINTMENT (OUTPATIENT)
Dept: INTERNAL MEDICINE | Facility: CLINIC | Age: 43
End: 2018-07-06
Payer: MEDICAID

## 2018-07-06 VITALS
BODY MASS INDEX: 36.99 KG/M2 | DIASTOLIC BLOOD PRESSURE: 80 MMHG | WEIGHT: 222 LBS | SYSTOLIC BLOOD PRESSURE: 124 MMHG | HEIGHT: 65 IN

## 2018-07-06 VITALS — HEART RATE: 91 BPM | SYSTOLIC BLOOD PRESSURE: 124 MMHG | DIASTOLIC BLOOD PRESSURE: 90 MMHG

## 2018-07-06 DIAGNOSIS — Z90.710 ACQUIRED ABSENCE OF BOTH CERVIX AND UTERUS: Chronic | ICD-10-CM

## 2018-07-06 DIAGNOSIS — I10 ESSENTIAL (PRIMARY) HYPERTENSION: ICD-10-CM

## 2018-07-06 DIAGNOSIS — E66.9 OBESITY, UNSPECIFIED: ICD-10-CM

## 2018-07-06 PROCEDURE — 99213 OFFICE O/P EST LOW 20 MIN: CPT | Mod: GE

## 2018-07-06 PROCEDURE — G0463: CPT

## 2018-07-06 RX ORDER — METOPROLOL TARTRATE 25 MG/1
25 TABLET, FILM COATED ORAL
Qty: 90 | Refills: 0 | Status: DISCONTINUED | COMMUNITY
Start: 2017-05-08 | End: 2018-07-06

## 2018-07-07 NOTE — PHYSICAL EXAM
[No Acute Distress] : no acute distress [Well Nourished] : well nourished [Well Developed] : well developed [Well-Appearing] : well-appearing [No Respiratory Distress] : no respiratory distress  [Clear to Auscultation] : lungs were clear to auscultation bilaterally [No Accessory Muscle Use] : no accessory muscle use [Normal Rate] : normal rate  [Regular Rhythm] : with a regular rhythm [Normal S1, S2] : normal S1 and S2 [No Murmur] : no murmur heard [No Edema] : there was no peripheral edema [Soft] : abdomen soft [Non Tender] : non-tender [Non-distended] : non-distended [Normal Affect] : the affect was normal [Normal Mood] : the mood was normal [de-identified] : Plamar eczematous rash

## 2018-07-07 NOTE — PLAN
[FreeTextEntry1] : 41 yo F pmhx Stage I endometrial cancer s/p COURT-BSO (2012, previously on estrogen), HTN, T2DM (HgA1c July 2017, 6.8%), latent TB s/p 9 month INH, chronic sinusitis, lumbar spine L3-L4 herniation (1/2016), abscess of thigh (Sept 2017, [facility]), who presents for f/u. \par \par # HTN: Well controlled in 120s systolic. \par - Continue losartan 25 daily (not on ACE due to cough)\par - Continue off metoprolol\par \par # T2DM: \par - Changed metformin this week to 1000 BID\par - Does not report any symptoms of abdominal discomfort or diarrhea\par - Repeat HgA1c in 3 month visit\par \par # Medication cessation:\par - Stopped metoprolol. Was previously on it for tachycardia.\par - HR in 90s. Continue to monitor off medication. \par \par # HyperCa\par - Decreased calcium to daily (300) (rather than )\par - Will need repeat laboratories (calcium) during next visit. \par \par # Obesity: Lost 7 lbs\par - Continue diet and exercise\par - Reports decreased appetite due to heat outside from summer\par \par D/w Chaka Benton\par - RTC 3 months T2DM check, HR check

## 2018-07-07 NOTE — REVIEW OF SYSTEMS
[Skin Rash] : skin rash [Fever] : no fever [Chills] : no chills [Chest Pain] : no chest pain [Palpitations] : no palpitations [Shortness Of Breath] : no shortness of breath [Abdominal Pain] : no abdominal pain [Diarrhea] : diarrhea [de-identified] : Improving [de-identified] : Carpal tunnel

## 2018-07-07 NOTE — HISTORY OF PRESENT ILLNESS
[de-identified] : 43 yo F pmhx Stage I endometrial cancer s/p COURT-BSO (2012, previously on estrogen), HTN, T2DM (HgA1c July 2017, 6.8%), latent TB s/p 9 month INH, chronic sinusitis, lumbar spine L3-L4 herniation (1/2016), abscess of thigh (Sept 2017, [facility]), who presents for f/u. \par \par - Blood pressure well controlled in visit today (124/70 systolic) with HR in 90s, on losartan, lisinopril caused cough previously\par - T2DM - went up on metformin earlier this week to 1000 BID\par \par - Has new dog which has kept her active. Now walks her dogs twice a day. \par - no lightheadedness dizziness except when getting up quickly\par - No diarrhea or abdominal upset. Last time it took a week from starting metformin prior to having symptoms. \par - No fevers/chills/SOB, CP/palpitations\par - Hands feels numbness from carpal tunnel\par - Eczema, goes through phases, then starts up again, tries not to use corticosteroids, but uses for certain spots. If really itchy if not moisturizes often. Due to see dermatologist. Last time saw her was w/ staph infection. Uses betamethasone. Has notices improved muscle aches with vitamin D. Looks at multivitamin and calcium.

## 2018-07-10 DIAGNOSIS — E66.9 OBESITY, UNSPECIFIED: ICD-10-CM

## 2018-07-10 DIAGNOSIS — Z79.899 OTHER LONG TERM (CURRENT) DRUG THERAPY: ICD-10-CM

## 2018-08-06 ENCOUNTER — APPOINTMENT (OUTPATIENT)
Dept: INTERNAL MEDICINE | Facility: CLINIC | Age: 43
End: 2018-08-06

## 2018-10-15 ENCOUNTER — APPOINTMENT (OUTPATIENT)
Dept: INTERNAL MEDICINE | Facility: CLINIC | Age: 43
End: 2018-10-15
Payer: MEDICAID

## 2018-10-15 ENCOUNTER — LABORATORY RESULT (OUTPATIENT)
Age: 43
End: 2018-10-15

## 2018-10-15 ENCOUNTER — RESULT CHARGE (OUTPATIENT)
Age: 43
End: 2018-10-15

## 2018-10-15 VITALS
SYSTOLIC BLOOD PRESSURE: 130 MMHG | WEIGHT: 222 LBS | BODY MASS INDEX: 36.99 KG/M2 | DIASTOLIC BLOOD PRESSURE: 80 MMHG | HEIGHT: 65 IN

## 2018-10-15 VITALS — OXYGEN SATURATION: 98 % | HEART RATE: 97 BPM

## 2018-10-15 LAB — HBA1C MFR BLD HPLC: 6.9

## 2018-10-15 PROCEDURE — 99213 OFFICE O/P EST LOW 20 MIN: CPT | Mod: GE

## 2018-10-15 RX ORDER — MULTIVIT-MIN/FOLIC/VIT K/LYCOP 400-300MCG
50 MCG TABLET ORAL
Refills: 0 | Status: DISCONTINUED | COMMUNITY
Start: 2018-07-06 | End: 2018-10-15

## 2018-10-19 ENCOUNTER — LABORATORY RESULT (OUTPATIENT)
Age: 43
End: 2018-10-19

## 2018-10-19 LAB
ANION GAP SERPL CALC-SCNC: 12 MMOL/L
BUN SERPL-MCNC: 15 MG/DL
CALCIUM SERPL-MCNC: 10.7 MG/DL
CHLORIDE SERPL-SCNC: 101 MMOL/L
CO2 SERPL-SCNC: 29 MMOL/L
CREAT SERPL-MCNC: 0.81 MG/DL
GLUCOSE SERPL-MCNC: 109 MG/DL
POTASSIUM SERPL-SCNC: 4.7 MMOL/L
SODIUM SERPL-SCNC: 142 MMOL/L
TSH SERPL-ACNC: 0.53 UIU/ML

## 2018-10-23 ENCOUNTER — LABORATORY RESULT (OUTPATIENT)
Age: 43
End: 2018-10-23

## 2018-10-24 ENCOUNTER — OUTPATIENT (OUTPATIENT)
Dept: OUTPATIENT SERVICES | Facility: HOSPITAL | Age: 43
LOS: 1 days | End: 2018-10-24
Payer: MEDICAID

## 2018-10-24 ENCOUNTER — APPOINTMENT (OUTPATIENT)
Dept: OBGYN | Facility: CLINIC | Age: 43
End: 2018-10-24
Payer: MEDICAID

## 2018-10-24 VITALS — WEIGHT: 122 LBS | BODY MASS INDEX: 20.3 KG/M2 | DIASTOLIC BLOOD PRESSURE: 98 MMHG | SYSTOLIC BLOOD PRESSURE: 150 MMHG

## 2018-10-24 DIAGNOSIS — Z90.710 ACQUIRED ABSENCE OF BOTH CERVIX AND UTERUS: Chronic | ICD-10-CM

## 2018-10-24 DIAGNOSIS — N76.0 ACUTE VAGINITIS: ICD-10-CM

## 2018-10-24 DIAGNOSIS — Z78.0 ASYMPTOMATIC MENOPAUSAL STATE: ICD-10-CM

## 2018-10-24 DIAGNOSIS — Z01.419 ENCOUNTER FOR GYNECOLOGICAL EXAMINATION (GENERAL) (ROUTINE) W/OUT ABNORMAL FINDINGS: ICD-10-CM

## 2018-10-24 PROCEDURE — 87591 N.GONORRHOEAE DNA AMP PROB: CPT

## 2018-10-24 PROCEDURE — G0463: CPT

## 2018-10-24 PROCEDURE — 87800 DETECT AGNT MULT DNA DIREC: CPT

## 2018-10-24 PROCEDURE — 87491 CHLMYD TRACH DNA AMP PROBE: CPT

## 2018-10-24 PROCEDURE — 99213 OFFICE O/P EST LOW 20 MIN: CPT | Mod: GE

## 2018-10-25 LAB
C TRACH RRNA SPEC QL NAA+PROBE: SIGNIFICANT CHANGE UP
CANDIDA AB TITR SER: SIGNIFICANT CHANGE UP
G VAGINALIS DNA SPEC QL NAA+PROBE: DETECTED
N GONORRHOEA RRNA SPEC QL NAA+PROBE: SIGNIFICANT CHANGE UP
SPECIMEN SOURCE: SIGNIFICANT CHANGE UP
T VAGINALIS SPEC QL WET PREP: SIGNIFICANT CHANGE UP

## 2018-10-26 ENCOUNTER — RESULT REVIEW (OUTPATIENT)
Age: 43
End: 2018-10-26

## 2018-10-26 ENCOUNTER — MESSAGE (OUTPATIENT)
Age: 43
End: 2018-10-26

## 2018-10-26 DIAGNOSIS — B96.89 ACUTE VAGINITIS: ICD-10-CM

## 2018-10-26 DIAGNOSIS — N76.0 ACUTE VAGINITIS: ICD-10-CM

## 2018-11-01 NOTE — ASSESSMENT
[FreeTextEntry1] : 43 yo F pmhx Stage I endometrial cancer s/p COURT-BSO (2012, no longer on estrogen), HTN, T2DM (HgA1c July 2017, 6.8%), latent TB s/p 9 month INH, chronic sinusitis, lumbar spine L3-L4 herniation (1/2016), abscess of thigh (Sept 2017, [facility]), who presents for follow-up visit. \par \par # BP: Well controlled 134 SBP today on repeat (130/80 initially)\par - Continue losartan 25 daily\par \par # DM (HgA1c 6.9% today improved from 7.6%). \par - Continue metformin 1000 BID\par - Up to date with pneumovax (2016)\par - Will need updated microalbumin:Cr, continue ARB for BP control\par - Requested Jolene's help with setting up opthalmology appt as patient has had difficulty setting this up\par - Microfilament test normal\par \par # Mild hypercalcemia: (10.6)\par - Repeat BMP\par - Has stopped Ca/Vit D supplementation\par \par # Tiredness/loss of hair\par - TSH w/ reflex T4\par \par # Recent styes: Appears resolved\par - Unclear if susceptible to infections, however DM well controlled\par \par D/w Dr. Lind\par RTC 6 months (repeat blood work, DM check, BP check)

## 2018-11-01 NOTE — REVIEW OF SYSTEMS
[Fatigue] : fatigue [Hot Flashes] : hot flashes [Chest Pain] : no chest pain [Palpitations] : no palpitations [Frequency] : no frequency [de-identified] : No lightheadedness

## 2018-11-01 NOTE — HISTORY OF PRESENT ILLNESS
[FreeTextEntry1] : Follow-up Visit [de-identified] : 43 yo F pmhx Stage I endometrial cancer s/p COURT-BSO (2012, no longer on estrogen), HTN, T2DM (HgA1c July 2017, 6.8%), latent TB s/p 9 month INH, chronic sinusitis, lumbar spine L3-L4 herniation (1/2016), abscess of thigh (Sept 2017, [facility]), who presents for follow-up. \par \par # DM: No increased urination/lightheadedness or dizziness. \par \par # BP check: Has not had palpitations off of metoprolol. No chest pressure. Sometimes has headaches, more from tension from neck tightness, followed by sinus as face hurt. Work has not changed too much, but once Oct starts becomes busy. Base of occiput feels tight. Feels like tightness in jaw and wakes up w/ HA. But not woken up from sleep. Sometimes when rains a lot feels HA. usually does not take anything. \par \par # Lately feels more tired like can sleep the whole day. Feels hair falls out more. Does not feel cold all the time. Continues to have hot flashes. Thursday, prefers to drink cold water. Finds that she is more thirsty than usual\par \par # Stopped calcium because Ca was high, stopped vit D as well. Mainly takes multivitamin. Been taking venlafaxine every other day. Has not helped with hot flashes

## 2018-11-01 NOTE — PHYSICAL EXAM
[No Acute Distress] : no acute distress [Well Nourished] : well nourished [Well Developed] : well developed [Well-Appearing] : well-appearing [Thyroid Normal, No Nodules] : the thyroid was normal and there were no nodules present [No Respiratory Distress] : no respiratory distress  [Clear to Auscultation] : lungs were clear to auscultation bilaterally [No Accessory Muscle Use] : no accessory muscle use [Normal Rate] : normal rate  [Regular Rhythm] : with a regular rhythm [Normal S1, S2] : normal S1 and S2 [No Murmur] : no murmur heard [No Edema] : there was no peripheral edema [Soft] : abdomen soft [Non Tender] : non-tender [Non-distended] : non-distended [No Rash] : no rash [] : both feet [de-identified] : Both feet normal monofilament testing 1-6, 9,10

## 2018-11-02 DIAGNOSIS — Z79.899 OTHER LONG TERM (CURRENT) DRUG THERAPY: ICD-10-CM

## 2018-11-08 ENCOUNTER — APPOINTMENT (OUTPATIENT)
Dept: OPHTHALMOLOGY | Facility: CLINIC | Age: 43
End: 2018-11-08

## 2018-12-03 ENCOUNTER — APPOINTMENT (OUTPATIENT)
Dept: MAMMOGRAPHY | Facility: IMAGING CENTER | Age: 43
End: 2018-12-03

## 2018-12-03 ENCOUNTER — APPOINTMENT (OUTPATIENT)
Dept: RADIOLOGY | Facility: IMAGING CENTER | Age: 43
End: 2018-12-03

## 2018-12-09 ENCOUNTER — FORM ENCOUNTER (OUTPATIENT)
Age: 43
End: 2018-12-09

## 2018-12-10 ENCOUNTER — APPOINTMENT (OUTPATIENT)
Dept: RADIOLOGY | Facility: IMAGING CENTER | Age: 43
End: 2018-12-10
Payer: MEDICAID

## 2018-12-10 ENCOUNTER — OUTPATIENT (OUTPATIENT)
Dept: OUTPATIENT SERVICES | Facility: HOSPITAL | Age: 43
LOS: 1 days | End: 2018-12-10
Payer: MEDICAID

## 2018-12-10 ENCOUNTER — APPOINTMENT (OUTPATIENT)
Dept: MAMMOGRAPHY | Facility: IMAGING CENTER | Age: 43
End: 2018-12-10
Payer: MEDICAID

## 2018-12-10 DIAGNOSIS — Z01.419 ENCOUNTER FOR GYNECOLOGICAL EXAMINATION (GENERAL) (ROUTINE) WITHOUT ABNORMAL FINDINGS: ICD-10-CM

## 2018-12-10 DIAGNOSIS — M81.0 AGE-RELATED OSTEOPOROSIS WITHOUT CURRENT PATHOLOGICAL FRACTURE: ICD-10-CM

## 2018-12-10 DIAGNOSIS — Z90.710 ACQUIRED ABSENCE OF BOTH CERVIX AND UTERUS: Chronic | ICD-10-CM

## 2018-12-10 PROCEDURE — 77067 SCR MAMMO BI INCL CAD: CPT

## 2018-12-10 PROCEDURE — 77063 BREAST TOMOSYNTHESIS BI: CPT | Mod: 26

## 2018-12-10 PROCEDURE — 77067 SCR MAMMO BI INCL CAD: CPT | Mod: 26

## 2018-12-10 PROCEDURE — 77080 DXA BONE DENSITY AXIAL: CPT

## 2018-12-10 PROCEDURE — 77080 DXA BONE DENSITY AXIAL: CPT | Mod: 26

## 2018-12-10 PROCEDURE — 77063 BREAST TOMOSYNTHESIS BI: CPT

## 2018-12-18 ENCOUNTER — APPOINTMENT (OUTPATIENT)
Dept: OPHTHALMOLOGY | Facility: CLINIC | Age: 43
End: 2018-12-18
Payer: MEDICAID

## 2018-12-18 DIAGNOSIS — H04.123 DRY EYE SYNDROME OF BILATERAL LACRIMAL GLANDS: ICD-10-CM

## 2018-12-18 DIAGNOSIS — H52.203 MYOPIA, BILATERAL: ICD-10-CM

## 2018-12-18 DIAGNOSIS — H52.13 MYOPIA, BILATERAL: ICD-10-CM

## 2018-12-18 DIAGNOSIS — H52.4 MYOPIA, BILATERAL: ICD-10-CM

## 2018-12-18 DIAGNOSIS — H25.093 OTHER AGE-RELATED INCIPIENT CATARACT, BILATERAL: ICD-10-CM

## 2018-12-18 PROCEDURE — 99202 OFFICE O/P NEW SF 15 MIN: CPT

## 2018-12-18 PROCEDURE — 92015 DETERMINE REFRACTIVE STATE: CPT

## 2019-01-28 ENCOUNTER — APPOINTMENT (OUTPATIENT)
Dept: INTERNAL MEDICINE | Facility: CLINIC | Age: 44
End: 2019-01-28

## 2019-03-04 ENCOUNTER — APPOINTMENT (OUTPATIENT)
Dept: INTERNAL MEDICINE | Facility: CLINIC | Age: 44
End: 2019-03-04

## 2019-05-13 ENCOUNTER — LABORATORY RESULT (OUTPATIENT)
Age: 44
End: 2019-05-13

## 2019-05-13 ENCOUNTER — APPOINTMENT (OUTPATIENT)
Dept: INTERNAL MEDICINE | Facility: CLINIC | Age: 44
End: 2019-05-13
Payer: MEDICAID

## 2019-05-13 ENCOUNTER — RESULT CHARGE (OUTPATIENT)
Age: 44
End: 2019-05-13

## 2019-05-13 ENCOUNTER — OUTPATIENT (OUTPATIENT)
Dept: OUTPATIENT SERVICES | Facility: HOSPITAL | Age: 44
LOS: 1 days | End: 2019-05-13
Payer: MEDICAID

## 2019-05-13 ENCOUNTER — MEDICATION RENEWAL (OUTPATIENT)
Age: 44
End: 2019-05-13

## 2019-05-13 VITALS
OXYGEN SATURATION: 99 % | SYSTOLIC BLOOD PRESSURE: 170 MMHG | HEART RATE: 109 BPM | BODY MASS INDEX: 35.82 KG/M2 | WEIGHT: 215 LBS | HEIGHT: 65 IN | DIASTOLIC BLOOD PRESSURE: 90 MMHG

## 2019-05-13 VITALS — DIASTOLIC BLOOD PRESSURE: 100 MMHG | SYSTOLIC BLOOD PRESSURE: 158 MMHG

## 2019-05-13 DIAGNOSIS — E83.52 HYPERCALCEMIA: ICD-10-CM

## 2019-05-13 DIAGNOSIS — R22.1 LOCALIZED SWELLING, MASS AND LUMP, NECK: ICD-10-CM

## 2019-05-13 DIAGNOSIS — N95.1 MENOPAUSAL AND FEMALE CLIMACTERIC STATES: ICD-10-CM

## 2019-05-13 DIAGNOSIS — M51.26 OTHER INTERVERTEBRAL DISC DISPLACEMENT, LUMBAR REGION: ICD-10-CM

## 2019-05-13 DIAGNOSIS — Z90.710 ACQUIRED ABSENCE OF BOTH CERVIX AND UTERUS: Chronic | ICD-10-CM

## 2019-05-13 LAB — HBA1C MFR BLD HPLC: 7

## 2019-05-13 PROCEDURE — 82310 ASSAY OF CALCIUM: CPT

## 2019-05-13 PROCEDURE — 86803 HEPATITIS C AB TEST: CPT

## 2019-05-13 PROCEDURE — 82306 VITAMIN D 25 HYDROXY: CPT

## 2019-05-13 PROCEDURE — 99213 OFFICE O/P EST LOW 20 MIN: CPT | Mod: GE

## 2019-05-13 PROCEDURE — 85027 COMPLETE CBC AUTOMATED: CPT

## 2019-05-13 PROCEDURE — G0463: CPT

## 2019-05-13 PROCEDURE — 83519 RIA NONANTIBODY: CPT

## 2019-05-13 PROCEDURE — 83970 ASSAY OF PARATHORMONE: CPT

## 2019-05-13 PROCEDURE — 80048 BASIC METABOLIC PNL TOTAL CA: CPT

## 2019-05-13 PROCEDURE — 36415 COLL VENOUS BLD VENIPUNCTURE: CPT

## 2019-05-13 PROCEDURE — 84443 ASSAY THYROID STIM HORMONE: CPT

## 2019-05-13 PROCEDURE — 87340 HEPATITIS B SURFACE AG IA: CPT

## 2019-05-13 PROCEDURE — 86706 HEP B SURFACE ANTIBODY: CPT

## 2019-05-13 PROCEDURE — 82043 UR ALBUMIN QUANTITATIVE: CPT

## 2019-05-13 RX ORDER — METRONIDAZOLE 7.5 MG/G
0.75 GEL VAGINAL
Qty: 1 | Refills: 0 | Status: DISCONTINUED | COMMUNITY
Start: 2018-10-26 | End: 2019-05-13

## 2019-05-14 ENCOUNTER — MESSAGE (OUTPATIENT)
Age: 44
End: 2019-05-14

## 2019-05-14 DIAGNOSIS — I10 ESSENTIAL (PRIMARY) HYPERTENSION: ICD-10-CM

## 2019-05-14 LAB
24R-OH-CALCIDIOL SERPL-MCNC: 20.9 NG/ML — LOW (ref 30–80)
CALCIUM SERPL-MCNC: 10 MG/DL — SIGNIFICANT CHANGE UP (ref 8.4–10.5)
CALCIUM SERPL-MCNC: 10 MG/DL — SIGNIFICANT CHANGE UP (ref 8.4–10.5)
CREAT ?TM UR-MCNC: 107 MG/DL — SIGNIFICANT CHANGE UP
HBV SURFACE AB SER-ACNC: SIGNIFICANT CHANGE UP
HBV SURFACE AG SER-ACNC: SIGNIFICANT CHANGE UP
MICROALBUMIN UR-MCNC: 9.5 MG/DL — SIGNIFICANT CHANGE UP
MICROALBUMIN/CREAT UR-RTO: 89 MG/G — HIGH (ref 0–30)
PTH-INTACT FLD-MCNC: 28 PG/ML — SIGNIFICANT CHANGE UP (ref 15–65)
T4 FREE+ TSH PNL SERPL: 0.79 UIU/ML — SIGNIFICANT CHANGE UP (ref 0.27–4.2)

## 2019-05-15 LAB
ANION GAP SERPL CALC-SCNC: 20 MMOL/L — HIGH (ref 5–17)
BUN SERPL-MCNC: 12 MG/DL — SIGNIFICANT CHANGE UP (ref 7–23)
CHLORIDE SERPL-SCNC: 103 MMOL/L — SIGNIFICANT CHANGE UP (ref 96–108)
CO2 SERPL-SCNC: 21 MMOL/L — LOW (ref 22–31)
CREAT SERPL-MCNC: 0.49 MG/DL — LOW (ref 0.5–1.3)
GLUCOSE SERPL-MCNC: 142 MG/DL — HIGH (ref 70–99)
HCT VFR BLD CALC: 45.3 % — HIGH (ref 34.5–45)
HGB BLD-MCNC: 13.8 G/DL — SIGNIFICANT CHANGE UP (ref 11.5–15.5)
MCHC RBC-ENTMCNC: 26.7 PG — LOW (ref 27–34)
MCHC RBC-ENTMCNC: 30.5 GM/DL — LOW (ref 32–36)
MCV RBC AUTO: 87.6 FL — SIGNIFICANT CHANGE UP (ref 80–100)
PLATELET # BLD AUTO: 409 K/UL — HIGH (ref 150–400)
POTASSIUM SERPL-MCNC: 4.4 MMOL/L — SIGNIFICANT CHANGE UP (ref 3.5–5.3)
POTASSIUM SERPL-SCNC: 4.4 MMOL/L — SIGNIFICANT CHANGE UP (ref 3.5–5.3)
RBC # BLD: 5.17 M/UL — SIGNIFICANT CHANGE UP (ref 3.8–5.2)
RBC # FLD: 14.3 % — SIGNIFICANT CHANGE UP (ref 10.3–14.5)
SODIUM SERPL-SCNC: 144 MMOL/L — SIGNIFICANT CHANGE UP (ref 135–145)
WBC # BLD: 7.16 K/UL — SIGNIFICANT CHANGE UP (ref 3.8–10.5)
WBC # FLD AUTO: 7.16 K/UL — SIGNIFICANT CHANGE UP (ref 3.8–10.5)

## 2019-05-18 RX ORDER — UBIDECARENONE/VIT E ACET 100MG-5
50 MCG CAPSULE ORAL
Qty: 30 | Refills: 3 | Status: ACTIVE | COMMUNITY
Start: 2019-05-18 | End: 1900-01-01

## 2019-05-19 LAB — PTH RELATED PROT SERPL-MCNC: <2 PMOL/L — SIGNIFICANT CHANGE UP

## 2019-05-20 DIAGNOSIS — E11.9 TYPE 2 DIABETES MELLITUS WITHOUT COMPLICATIONS: ICD-10-CM

## 2019-05-20 DIAGNOSIS — R22.1 LOCALIZED SWELLING, MASS AND LUMP, NECK: ICD-10-CM

## 2019-05-20 DIAGNOSIS — N95.1 MENOPAUSAL AND FEMALE CLIMACTERIC STATES: ICD-10-CM

## 2019-05-20 DIAGNOSIS — Z79.899 OTHER LONG TERM (CURRENT) DRUG THERAPY: ICD-10-CM

## 2019-05-20 DIAGNOSIS — E83.52 HYPERCALCEMIA: ICD-10-CM

## 2019-05-20 DIAGNOSIS — M65.30 TRIGGER FINGER, UNSPECIFIED FINGER: ICD-10-CM

## 2019-05-20 DIAGNOSIS — G47.9 SLEEP DISORDER, UNSPECIFIED: ICD-10-CM

## 2019-05-20 NOTE — REVIEW OF SYSTEMS
[Fever] : no fever [Chills] : no chills [Chest Pain] : no chest pain [Palpitations] : no palpitations [FreeTextEntry4] : Post nasal drip [FreeTextEntry6] : Occasional shortness of breath at night [FreeTextEntry7] : Denied blood in stool [FreeTextEntry8] : Denied vaginal bleeding [de-identified] : Small papule on leg

## 2019-05-20 NOTE — HISTORY OF PRESENT ILLNESS
[de-identified] : 44 yo F pmhx Stage I endometrial cancer s/p COURT-BSO (2012, no longer on estrogen), HTN, T2DM (HgA1c July 2017, 6.8%), latent TB s/p 9 month INH, chronic sinusitis, lumbar spine L3-L4 herniation (1/2016), abscess of thigh (Sept 2017, [facility]), who presents for follow-up. \par \par # T2DM - Has been unable to check her sugars as she does not have a glucometer and the test strips are expensive. \par \par # Difficulty sleeping:\par - She has noticed that when she wakes up she does not feel well rested. She typically sleeps 6-7 hours and reads a little prior to sleep. She wakes up once at night to use the restroom. She has felt fatigue and that it has been difficult to get up out of bed. She reports some snoring from her history of deviated septum. She wakes up occasionally (last episode over a week ago) with uneasy feeling in chest. She denied any symptoms of anxiety, panic, chest pain or pressure, or palpitations. She felt short of breath with this episode and for a period of time would sit forward to help with her breathing. She has been taking benadryl to help her sleep as well as the velafaxine. \par \par She denies any symptoms of depression, and if anything while on antidepressants feels a sense of numbness. No fevers, chills, although continues to have sweats. Reports mild sinus HA and cough. No vaginal bleeding or bleeding per rectum. Has R groin and proximal leg pain from lumbar pain. No burning with urination or urinating more often.

## 2019-05-20 NOTE — ASSESSMENT
[FreeTextEntry1] : 42 yo F pmhx Stage I endometrial cancer s/p COURT-BSO (2012, no longer on estrogen), HTN, T2DM (HgA1c July 2017, 6.8%), latent TB s/p 9 month INH, chronic sinusitis, lumbar spine L3-L4 herniation (1/2016), abscess of thigh (Sept 2017, [facility]), who presents for follow-up. \par \par # Difficulty feeling well rested: Unclear if underlying sleep apnea. No depression. \par - Recommended conservative treatment (melatonin and chamomile tea)\par - Sending for sleep study and sleep referral\par - CBC/TSH\par - Consider TTE if sleep apnea noted on sleep study\par \par # T2DM: A1c 7.0% today, at goal\par - Metformin 1000 BID\par - Follow-up w/ pharmacy regarding affordable options for glucometer/test strips\par \par # HTN: SBP initially 170, improved to 158/100. No HA or chest pain\par - Increased losartan to 50\par \par # Hypercalcemia:\par - BMP, PTH and vit D today\par \par # History of tattoo:\par - HIV/Hep C\par \par D/w Dr. Barnes\par RTC - 5 week for BP check

## 2019-05-20 NOTE — PHYSICAL EXAM
[No Acute Distress] : no acute distress [Well Nourished] : well nourished [Well Developed] : well developed [Well-Appearing] : well-appearing [No Respiratory Distress] : no respiratory distress  [Clear to Auscultation] : lungs were clear to auscultation bilaterally [Normal Rate] : normal rate  [No Accessory Muscle Use] : no accessory muscle use [Regular Rhythm] : with a regular rhythm [Normal S1, S2] : normal S1 and S2 [Soft] : abdomen soft [No Edema] : there was no peripheral edema [Non Tender] : non-tender [Non-distended] : non-distended [Normal Bowel Sounds] : normal bowel sounds [de-identified] : Small papule on leg, with erythema, no tenderness

## 2019-05-23 ENCOUNTER — RX RENEWAL (OUTPATIENT)
Age: 44
End: 2019-05-23

## 2019-06-02 ENCOUNTER — FORM ENCOUNTER (OUTPATIENT)
Age: 44
End: 2019-06-02

## 2019-06-03 ENCOUNTER — APPOINTMENT (OUTPATIENT)
Dept: ULTRASOUND IMAGING | Facility: IMAGING CENTER | Age: 44
End: 2019-06-03
Payer: MEDICAID

## 2019-06-03 ENCOUNTER — OUTPATIENT (OUTPATIENT)
Dept: OUTPATIENT SERVICES | Facility: HOSPITAL | Age: 44
LOS: 1 days | End: 2019-06-03
Payer: MEDICAID

## 2019-06-03 DIAGNOSIS — R22.1 LOCALIZED SWELLING, MASS AND LUMP, NECK: ICD-10-CM

## 2019-06-03 DIAGNOSIS — Z90.710 ACQUIRED ABSENCE OF BOTH CERVIX AND UTERUS: Chronic | ICD-10-CM

## 2019-06-03 PROCEDURE — 76536 US EXAM OF HEAD AND NECK: CPT | Mod: 26

## 2019-06-03 PROCEDURE — 76536 US EXAM OF HEAD AND NECK: CPT

## 2019-07-25 ENCOUNTER — APPOINTMENT (OUTPATIENT)
Dept: INTERNAL MEDICINE | Facility: CLINIC | Age: 44
End: 2019-07-25
Payer: MEDICAID

## 2019-07-25 ENCOUNTER — OUTPATIENT (OUTPATIENT)
Dept: OUTPATIENT SERVICES | Facility: HOSPITAL | Age: 44
LOS: 1 days | End: 2019-07-25
Payer: MEDICAID

## 2019-07-25 VITALS
HEART RATE: 103 BPM | SYSTOLIC BLOOD PRESSURE: 120 MMHG | DIASTOLIC BLOOD PRESSURE: 80 MMHG | BODY MASS INDEX: 35.99 KG/M2 | WEIGHT: 216 LBS | HEIGHT: 65 IN | OXYGEN SATURATION: 98 %

## 2019-07-25 DIAGNOSIS — G47.9 SLEEP DISORDER, UNSPECIFIED: ICD-10-CM

## 2019-07-25 DIAGNOSIS — I10 ESSENTIAL (PRIMARY) HYPERTENSION: ICD-10-CM

## 2019-07-25 DIAGNOSIS — Z90.710 ACQUIRED ABSENCE OF BOTH CERVIX AND UTERUS: Chronic | ICD-10-CM

## 2019-07-25 PROCEDURE — G0463: CPT

## 2019-07-25 PROCEDURE — 99213 OFFICE O/P EST LOW 20 MIN: CPT | Mod: GE

## 2019-07-25 NOTE — ASSESSMENT
[FreeTextEntry1] : 42 y/o F PMH significant for Stage I endometrial cancer s/p COURT-BSO (2012, no longer on estrogen), HTN, T2DM (HgA1c May 2019, 7.0%), latent TB s/p 9 month INH, chronic sinusitis, lumbar spine L3-L4 herniation (1/2016), abscess of thigh (Sept 2017, [facility]), who presents today for a CPE.\par \par 1) HCM\par - labs were drawn at last visit, no bloodwork today\par - mammo due in December --> will get referral from OBGYN in October\par - no further PAPs \par \par 2) HTN\par - BP better controlled on losartan 50mg\par - continue with this dose\par \par 3) DM\par - A1c 7.0% May 2019\par - eye exam at the end of the year\par - diabetic foot exam done at last visit (May 2019)\par - continue metformin 1000 mg BID\par \par 4) Endometrial CA\par - s/p COURT-BSO\par - follows with OBGYN yearly, no active therapy (on surveillance)\par - sleep disturbances secondary to menopause from COURT, continue melatonin as needed\par \par RTC in October for FU visit and flu shot (can check A1c at that time)\par Discussed with Dr. Rebollar

## 2019-07-25 NOTE — REVIEW OF SYSTEMS
[Fever] : no fever [Chills] : no chills [Vision Problems] : no vision problems [Chest Pain] : no chest pain [Palpitations] : no palpitations [Shortness Of Breath] : no shortness of breath [Wheezing] : no wheezing [Abdominal Pain] : no abdominal pain [Nausea] : no nausea [Vomiting] : no vomiting [Dysuria] : no dysuria [Joint Pain] : no joint pain [Itching] : no itching [Dizziness] : no dizziness [Depression] : no depression [Easy Bleeding] : no easy bleeding

## 2019-07-25 NOTE — HISTORY OF PRESENT ILLNESS
[FreeTextEntry1] : CPE [de-identified] : 44 y/o F PMH significant for Stage I endometrial cancer s/p COURT-BSO (2012, no longer on estrogen), HTN, T2DM (HgA1c May 2019, 7.0%), latent TB s/p 9 month INH, chronic sinusitis, lumbar spine L3-L4 herniation (1/2016), abscess of thigh (Sept 2017, [facility]), who presents today for a CPE.\par \par She was last seen in May 2019. At that time, her BP was noted to be high and her losartan was increased to 50mg daily. Today her BP is better controlled and at goal. She overall feels well. Still having trouble sleeping but attributes it to menopause. Continues to rake venlafaxine per OBGYN for her hot flashes. \par \par She works as a massage therapist. Lives with mom, brother, and sister. No tobacco or recreational drugs. Drinks wine 1-2x per month. Eats a varied diet high in fruits and vegetables but tends to eat white bread and pasta.

## 2019-07-25 NOTE — PHYSICAL EXAM
[No Acute Distress] : no acute distress [Well Nourished] : well nourished [PERRL] : pupils equal round and reactive to light [EOMI] : extraocular movements intact [Normal Oropharynx] : the oropharynx was normal [Normal TMs] : both tympanic membranes were normal [No JVD] : no jugular venous distention [Thyroid Normal, No Nodules] : the thyroid was normal and there were no nodules present [No Respiratory Distress] : no respiratory distress  [Clear to Auscultation] : lungs were clear to auscultation bilaterally [Normal Rate] : normal rate  [Regular Rhythm] : with a regular rhythm [Normal S1, S2] : normal S1 and S2 [No Edema] : there was no peripheral edema [Soft] : abdomen soft [Non Tender] : non-tender [No HSM] : no HSM [Normal Bowel Sounds] : normal bowel sounds [No CVA Tenderness] : no CVA  tenderness [No Spinal Tenderness] : no spinal tenderness [Grossly Normal Strength/Tone] : grossly normal strength/tone [Normal Gait] : normal gait [Alert and Oriented x3] : oriented to person, place, and time

## 2019-08-02 DIAGNOSIS — E11.9 TYPE 2 DIABETES MELLITUS WITHOUT COMPLICATIONS: ICD-10-CM

## 2019-08-02 DIAGNOSIS — E55.9 VITAMIN D DEFICIENCY, UNSPECIFIED: ICD-10-CM

## 2019-08-02 DIAGNOSIS — G47.9 SLEEP DISORDER, UNSPECIFIED: ICD-10-CM

## 2019-09-30 ENCOUNTER — RESULT CHARGE (OUTPATIENT)
Age: 44
End: 2019-09-30

## 2019-09-30 ENCOUNTER — APPOINTMENT (OUTPATIENT)
Dept: INTERNAL MEDICINE | Facility: CLINIC | Age: 44
End: 2019-09-30
Payer: MEDICAID

## 2019-09-30 ENCOUNTER — OUTPATIENT (OUTPATIENT)
Dept: OUTPATIENT SERVICES | Facility: HOSPITAL | Age: 44
LOS: 1 days | End: 2019-09-30
Payer: MEDICAID

## 2019-09-30 VITALS
SYSTOLIC BLOOD PRESSURE: 152 MMHG | WEIGHT: 222 LBS | BODY MASS INDEX: 36.94 KG/M2 | OXYGEN SATURATION: 97 % | HEART RATE: 103 BPM | DIASTOLIC BLOOD PRESSURE: 90 MMHG

## 2019-09-30 DIAGNOSIS — I10 ESSENTIAL (PRIMARY) HYPERTENSION: ICD-10-CM

## 2019-09-30 DIAGNOSIS — R09.81 NASAL CONGESTION: ICD-10-CM

## 2019-09-30 DIAGNOSIS — Z90.710 ACQUIRED ABSENCE OF BOTH CERVIX AND UTERUS: Chronic | ICD-10-CM

## 2019-09-30 LAB — HBA1C MFR BLD HPLC: 7.8

## 2019-09-30 PROCEDURE — G0463: CPT

## 2019-09-30 PROCEDURE — 99214 OFFICE O/P EST MOD 30 MIN: CPT | Mod: GC

## 2019-10-02 NOTE — PHYSICAL EXAM
[No Acute Distress] : no acute distress [Well Nourished] : well nourished [Well-Appearing] : well-appearing [Well Developed] : well developed [Normal Rate] : normal rate  [Regular Rhythm] : with a regular rhythm [Normal S1, S2] : normal S1 and S2 [No Murmur] : no murmur heard [No Edema] : there was no peripheral edema [Soft] : abdomen soft [Non-distended] : non-distended [Non Tender] : non-tender [No Masses] : no abdominal mass palpated [No Rash] : no rash [de-identified] : No conjunctival irritation [de-identified] : Nasal erythema, no tenderness of sinuses [de-identified] : Mild submental swelling

## 2019-10-02 NOTE — HISTORY OF PRESENT ILLNESS
[de-identified] : 42 y/o F PMH significant for Stage I endometrial cancer s/p COURT-BSO (, no longer on estrogen), HTN, T2DM (HgA1c May 2019, 7.0%), latent TB s/p 9 month INH, chronic sinusitis, lumbar spine L3-L4 herniation (2016), abscess of thigh (2017, [facility]), who presents today for a follow-up.\par \par Started coughing a few days ago, now not sure. Has the gross taste in back in mouth. Had sinus pain, have not felt sick. Dog  unexpectedly. Has not been drinking liquids recently. No fevers or chills. Sometimes hot flashes lingers. Comes up to a certain point, simmers to feel warm/flushed. Today feels fine. No congestion in throat/chest. Ever since had laryngitis has felt voice has been raspy since. Starting to think this is environmental, when away on vacation could have had allergy and environment. No trouble swallowing, sore throat or  pain with swallowing. \par \par Headache, more because clench jaw sometimes and more related to sinuses. Has not seen ENT in many years. \par \par Has not missed any medications. Still feels overly warm and tossing and turning more at night.

## 2019-10-02 NOTE — REVIEW OF SYSTEMS
[Fever] : no fever [Chills] : no chills [Chest Pain] : no chest pain [Palpitations] : no palpitations [Shortness Of Breath] : no shortness of breath [Constipation] : no constipation [Headache] : no headache [Suicidal] : not suicidal [Depression] : no depression [FreeTextEntry4] : Cough, congestion, nasal drip [FreeTextEntry7] : Has had increased diarrhea [de-identified] : No blurred vision [de-identified] : No SI

## 2019-10-02 NOTE — PLAN
[FreeTextEntry1] : 42 yo F PMH significant for Stage I endometrial cancer s/p COURT-BSO (2012, no longer on estrogen), HTN, T2DM (HgA1c May 2019, 7.0%), latent TB s/p 9 month INH, chronic sinusitis, lumbar spine L3-L4 herniation (1/2016), abscess of thigh (Sept 2017, [facility]), who presents today for a follow-up.\par \par # T2DM: A1c increased to 7.8% today in setting of change in diet. Has been eating additional unhealthy food after vacation and craving additional sweets. \par - Discussed returning to original diet as she had controlled diabetes\par - If persistently elevated in 3 months may need januvia\par - Continue metformin 1000 BID\par - Resent scripts for test strips and lancets to monitor FSG at home\par \par # Recent stress: Lost dog this past week and has had worsening of diarrhea from IBS\par - Encourage support\par - If worsening diarrhea will need follow-up with GI\par \par #HTN: /100 on repeat today\par - Increased losartan to 100\par - RTC in 5 weeks for BP check and BMP\par \par # Congestion/cough: Possible post-nasal drip vs allergic rhinitis\par - Trial of ipatropium for next 3 days to dry up nasal congestion\par - Continue home flonase\par \par # Hot flashes\par - Renewed venlafaxine\par - Denied history of SI previously with this medication\par \par D/w Dr. Jamil\par - Will need flu shot next visit

## 2019-10-02 NOTE — END OF VISIT
[] : Resident [FreeTextEntry3] : A1c uptrending but previously controlled. Encourage pt to return to previous lifestyle

## 2019-10-04 DIAGNOSIS — E11.9 TYPE 2 DIABETES MELLITUS WITHOUT COMPLICATIONS: ICD-10-CM

## 2019-10-04 DIAGNOSIS — R09.81 NASAL CONGESTION: ICD-10-CM

## 2019-10-28 ENCOUNTER — APPOINTMENT (OUTPATIENT)
Dept: OBGYN | Facility: CLINIC | Age: 44
End: 2019-10-28

## 2019-11-06 ENCOUNTER — LABORATORY RESULT (OUTPATIENT)
Age: 44
End: 2019-11-06

## 2019-11-06 ENCOUNTER — OUTPATIENT (OUTPATIENT)
Dept: OUTPATIENT SERVICES | Facility: HOSPITAL | Age: 44
LOS: 1 days | End: 2019-11-06
Payer: MEDICAID

## 2019-11-06 ENCOUNTER — APPOINTMENT (OUTPATIENT)
Dept: INTERNAL MEDICINE | Facility: CLINIC | Age: 44
End: 2019-11-06
Payer: MEDICAID

## 2019-11-06 VITALS
WEIGHT: 217 LBS | SYSTOLIC BLOOD PRESSURE: 190 MMHG | BODY MASS INDEX: 36.15 KG/M2 | HEIGHT: 65 IN | DIASTOLIC BLOOD PRESSURE: 100 MMHG

## 2019-11-06 DIAGNOSIS — I10 ESSENTIAL (PRIMARY) HYPERTENSION: ICD-10-CM

## 2019-11-06 DIAGNOSIS — Z23 ENCOUNTER FOR IMMUNIZATION: ICD-10-CM

## 2019-11-06 DIAGNOSIS — Z90.710 ACQUIRED ABSENCE OF BOTH CERVIX AND UTERUS: Chronic | ICD-10-CM

## 2019-11-06 PROCEDURE — 90688 IIV4 VACCINE SPLT 0.5 ML IM: CPT

## 2019-11-06 PROCEDURE — G0463: CPT | Mod: 25

## 2019-11-06 PROCEDURE — 80048 BASIC METABOLIC PNL TOTAL CA: CPT

## 2019-11-06 PROCEDURE — G0008: CPT

## 2019-11-06 PROCEDURE — 99213 OFFICE O/P EST LOW 20 MIN: CPT | Mod: GE

## 2019-11-06 RX ORDER — IPRATROPIUM BROMIDE 21 UG/1
0.03 SPRAY NASAL 3 TIMES DAILY
Qty: 15 | Refills: 0 | Status: DISCONTINUED | COMMUNITY
Start: 2019-09-30 | End: 2019-11-06

## 2019-11-06 NOTE — PHYSICAL EXAM
[No Acute Distress] : no acute distress [Well Nourished] : well nourished [Well Developed] : well developed [Well-Appearing] : well-appearing [No Respiratory Distress] : no respiratory distress  [No Accessory Muscle Use] : no accessory muscle use [Clear to Auscultation] : lungs were clear to auscultation bilaterally [Normal Rate] : normal rate  [Regular Rhythm] : with a regular rhythm [Normal S1, S2] : normal S1 and S2 [No Murmur] : no murmur heard [No Edema] : there was no peripheral edema [Soft] : abdomen soft [Non Tender] : non-tender [Non-distended] : non-distended [No Masses] : no abdominal mass palpated [No HSM] : no HSM [No Rash] : no rash

## 2019-11-07 VITALS — DIASTOLIC BLOOD PRESSURE: 102 MMHG | SYSTOLIC BLOOD PRESSURE: 140 MMHG

## 2019-11-07 LAB
ANION GAP SERPL CALC-SCNC: 16 MMOL/L — SIGNIFICANT CHANGE UP (ref 5–17)
BUN SERPL-MCNC: 12 MG/DL — SIGNIFICANT CHANGE UP (ref 7–23)
CALCIUM SERPL-MCNC: 10.5 MG/DL — SIGNIFICANT CHANGE UP (ref 8.4–10.5)
CHLORIDE SERPL-SCNC: 100 MMOL/L — SIGNIFICANT CHANGE UP (ref 96–108)
CO2 SERPL-SCNC: 26 MMOL/L — SIGNIFICANT CHANGE UP (ref 22–31)
CREAT SERPL-MCNC: 0.68 MG/DL — SIGNIFICANT CHANGE UP (ref 0.5–1.3)
GLUCOSE SERPL-MCNC: 111 MG/DL — HIGH (ref 70–99)
POTASSIUM SERPL-MCNC: 4.3 MMOL/L — SIGNIFICANT CHANGE UP (ref 3.5–5.3)
POTASSIUM SERPL-SCNC: 4.3 MMOL/L — SIGNIFICANT CHANGE UP (ref 3.5–5.3)
SODIUM SERPL-SCNC: 142 MMOL/L — SIGNIFICANT CHANGE UP (ref 135–145)

## 2019-11-11 ENCOUNTER — APPOINTMENT (OUTPATIENT)
Dept: OBGYN | Facility: CLINIC | Age: 44
End: 2019-11-11

## 2019-11-12 NOTE — HISTORY OF PRESENT ILLNESS
[de-identified] : 42 y/o F PMH significant for Stage I endometrial cancer s/p COURT-BSO (2012, no longer on estrogen), HTN, T2DM (HgA1c May 2019, 7.0%), latent TB s/p 9 month INH, chronic sinusitis, lumbar spine L3-L4 herniation (1/2016), abscess of thigh (Sept 2017, [facility]), who presents today for a blood pressure check.\par \par Blood pressure was elevated at the beginning of the visit. No HA, CP, shortness of breath, feels carpal tunnel worse, and no palpitations. No other symptoms, no difficulty with urinating, changes in urination. Yesterday had salty dinner, but otherwise good. Has been taking Claritin again, fall is usually when allergy i the worse. no sinus pressure, became worse, a little congestion and runny nose. FSGs - been better but still has room for improvement 130s recorded at home.

## 2019-11-12 NOTE — REVIEW OF SYSTEMS
[Chest Pain] : no chest pain [Palpitations] : no palpitations [Shortness Of Breath] : no shortness of breath [Wheezing] : no wheezing [FreeTextEntry8] : No change in urination [de-identified] : Worse carpal tunnel, no headache [de-identified] : No depression or SI

## 2019-11-12 NOTE — ASSESSMENT
[FreeTextEntry1] : 42 y/o F PMH significant for Stage I endometrial cancer s/p COUTR-BSO (2012, no longer on estrogen), HTN, T2DM (HgA1c May 2019, 7.0%), latent TB s/p 9 month INH, chronic sinusitis, lumbar spine L3-L4 herniation (1/2016), abscess of thigh (Sept 2017, [facility]), who presents today for a blood pressure check.\par \par # HTN: On repeat blood pressure check - 142/80, 140s/102, symmetric pulses without evidence for hypertensive emergency (no chest pain, shortness of breath, headache). \par - On repeat diastolic pressures both around 100, low suspicion for dissection\par - Continue losartan 100 daily (reports being able to  last script)\par - Starting amlodipine 2.5 today\par - Return to clinic in 5 weeks for BP check\par - BMP today (evaluate potassium and Cr) with increased losartan dose last visit. \par \par # Flu shot today\par \par D/w Dr. Hernandez\par RTC 5 weeks for BP check

## 2019-11-19 DIAGNOSIS — Z23 ENCOUNTER FOR IMMUNIZATION: ICD-10-CM

## 2019-11-19 DIAGNOSIS — Z79.899 OTHER LONG TERM (CURRENT) DRUG THERAPY: ICD-10-CM

## 2019-11-21 ENCOUNTER — LABORATORY RESULT (OUTPATIENT)
Age: 44
End: 2019-11-21

## 2019-11-21 ENCOUNTER — OUTPATIENT (OUTPATIENT)
Dept: OUTPATIENT SERVICES | Facility: HOSPITAL | Age: 44
LOS: 1 days | End: 2019-11-21
Payer: MEDICAID

## 2019-11-21 ENCOUNTER — APPOINTMENT (OUTPATIENT)
Dept: OBGYN | Facility: CLINIC | Age: 44
End: 2019-11-21
Payer: MEDICAID

## 2019-11-21 VITALS — BODY MASS INDEX: 36.11 KG/M2 | SYSTOLIC BLOOD PRESSURE: 140 MMHG | WEIGHT: 217 LBS | DIASTOLIC BLOOD PRESSURE: 100 MMHG

## 2019-11-21 DIAGNOSIS — N76.0 ACUTE VAGINITIS: ICD-10-CM

## 2019-11-21 DIAGNOSIS — Z90.710 ACQUIRED ABSENCE OF BOTH CERVIX AND UTERUS: Chronic | ICD-10-CM

## 2019-11-21 DIAGNOSIS — Z01.419 ENCOUNTER FOR GYNECOLOGICAL EXAMINATION (GENERAL) (ROUTINE) W/OUT ABNORMAL FINDINGS: ICD-10-CM

## 2019-11-21 PROCEDURE — 87389 HIV-1 AG W/HIV-1&-2 AB AG IA: CPT

## 2019-11-21 PROCEDURE — 87491 CHLMYD TRACH DNA AMP PROBE: CPT

## 2019-11-21 PROCEDURE — 87591 N.GONORRHOEAE DNA AMP PROB: CPT

## 2019-11-21 PROCEDURE — G0463: CPT

## 2019-11-21 PROCEDURE — 87340 HEPATITIS B SURFACE AG IA: CPT

## 2019-11-21 PROCEDURE — 99396 PREV VISIT EST AGE 40-64: CPT | Mod: NC

## 2019-11-21 PROCEDURE — 86780 TREPONEMA PALLIDUM: CPT

## 2019-11-22 LAB
C TRACH RRNA SPEC QL NAA+PROBE: SIGNIFICANT CHANGE UP
HBV SURFACE AG SER-ACNC: SIGNIFICANT CHANGE UP
HIV 1+2 AB+HIV1 P24 AG SERPL QL IA: SIGNIFICANT CHANGE UP
N GONORRHOEA RRNA SPEC QL NAA+PROBE: SIGNIFICANT CHANGE UP
SPECIMEN SOURCE: SIGNIFICANT CHANGE UP
T PALLIDUM AB TITR SER: NEGATIVE — SIGNIFICANT CHANGE UP

## 2019-11-24 NOTE — REVIEW OF SYSTEMS
[Fever] : no fever [Chills] : no chills [Chest Pain] : no chest pain [Dyspnea] : no shortness of breath [SOB on Exertion] : no shortness of breath during exertion [Abdominal Pain] : no abdominal pain [Dysuria] : no dysuria [Pelvic Pain] : no pelvic pain [Abn Vag Bleeding] : no abnormal vaginal bleeding

## 2019-11-24 NOTE — PHYSICAL EXAM
[Awake] : awake [Alert] : alert [Soft] : soft [Oriented x3] : oriented to person, place, and time [Normal] : urethra [Labia Majora] : labia major [Atrophy] : atrophy [No Bleeding] : there was no active vaginal bleeding [Absent] : absent [Adnexa Absent] : absent bilaterally [No Tenderness] : no rectal tenderness [RRR, No Murmurs] : RRR, no murmurs [CTAB] : CTAB [Acute Distress] : no acute distress [LAD] : no lymphadenopathy [Thyroid Nodule] : no thyroid nodule [Goiter] : no goiter [Mass] : no breast mass [Nipple Discharge] : no nipple discharge [Axillary LAD] : no axillary lymphadenopathy [Tender] : non tender [Distended] : not distended [H/Smegaly] : no hepatosplenomegaly [Depressed Mood] : not depressed [Flat Affect] : affect not flat [FreeTextEntry9] : rectovaginal exam normal without nodularity

## 2019-11-24 NOTE — PHYSICAL EXAM
[Awake] : awake [Alert] : alert [Soft] : soft [Oriented x3] : oriented to person, place, and time [Normal] : urethra [Labia Majora] : labia major [Atrophy] : atrophy [No Bleeding] : there was no active vaginal bleeding [Absent] : absent [Adnexa Absent] : absent bilaterally [No Tenderness] : no rectal tenderness [RRR, No Murmurs] : RRR, no murmurs [CTAB] : CTAB [Acute Distress] : no acute distress [LAD] : no lymphadenopathy [Goiter] : no goiter [Thyroid Nodule] : no thyroid nodule [Mass] : no breast mass [Nipple Discharge] : no nipple discharge [Tender] : non tender [Axillary LAD] : no axillary lymphadenopathy [Distended] : not distended [H/Smegaly] : no hepatosplenomegaly [Depressed Mood] : not depressed [Flat Affect] : affect not flat [FreeTextEntry9] : rectovaginal exam normal without nodularity

## 2019-12-02 DIAGNOSIS — Z01.419 ENCOUNTER FOR GYNECOLOGICAL EXAMINATION (GENERAL) (ROUTINE) WITHOUT ABNORMAL FINDINGS: ICD-10-CM

## 2019-12-09 ENCOUNTER — APPOINTMENT (OUTPATIENT)
Dept: INTERNAL MEDICINE | Facility: CLINIC | Age: 44
End: 2019-12-09

## 2020-01-15 ENCOUNTER — APPOINTMENT (OUTPATIENT)
Dept: INTERNAL MEDICINE | Facility: CLINIC | Age: 45
End: 2020-01-15

## 2020-01-26 ENCOUNTER — FORM ENCOUNTER (OUTPATIENT)
Age: 45
End: 2020-01-26

## 2020-01-27 ENCOUNTER — APPOINTMENT (OUTPATIENT)
Dept: MAMMOGRAPHY | Facility: IMAGING CENTER | Age: 45
End: 2020-01-27
Payer: MEDICAID

## 2020-01-27 ENCOUNTER — OUTPATIENT (OUTPATIENT)
Dept: OUTPATIENT SERVICES | Facility: HOSPITAL | Age: 45
LOS: 1 days | End: 2020-01-27
Payer: MEDICAID

## 2020-01-27 DIAGNOSIS — Z00.00 ENCOUNTER FOR GENERAL ADULT MEDICAL EXAMINATION WITHOUT ABNORMAL FINDINGS: ICD-10-CM

## 2020-01-27 DIAGNOSIS — Z90.710 ACQUIRED ABSENCE OF BOTH CERVIX AND UTERUS: Chronic | ICD-10-CM

## 2020-01-27 PROCEDURE — 77063 BREAST TOMOSYNTHESIS BI: CPT | Mod: 26

## 2020-01-27 PROCEDURE — 77067 SCR MAMMO BI INCL CAD: CPT

## 2020-01-27 PROCEDURE — 77067 SCR MAMMO BI INCL CAD: CPT | Mod: 26

## 2020-01-27 PROCEDURE — 77063 BREAST TOMOSYNTHESIS BI: CPT

## 2020-02-04 ENCOUNTER — FORM ENCOUNTER (OUTPATIENT)
Age: 45
End: 2020-02-04

## 2020-02-05 ENCOUNTER — OUTPATIENT (OUTPATIENT)
Dept: OUTPATIENT SERVICES | Facility: HOSPITAL | Age: 45
LOS: 1 days | End: 2020-02-05
Payer: MEDICAID

## 2020-02-05 ENCOUNTER — APPOINTMENT (OUTPATIENT)
Dept: ULTRASOUND IMAGING | Facility: IMAGING CENTER | Age: 45
End: 2020-02-05
Payer: MEDICAID

## 2020-02-05 ENCOUNTER — APPOINTMENT (OUTPATIENT)
Dept: MAMMOGRAPHY | Facility: IMAGING CENTER | Age: 45
End: 2020-02-05
Payer: MEDICAID

## 2020-02-05 DIAGNOSIS — Z00.00 ENCOUNTER FOR GENERAL ADULT MEDICAL EXAMINATION WITHOUT ABNORMAL FINDINGS: ICD-10-CM

## 2020-02-05 DIAGNOSIS — Z90.710 ACQUIRED ABSENCE OF BOTH CERVIX AND UTERUS: Chronic | ICD-10-CM

## 2020-02-05 PROCEDURE — 77066 DX MAMMO INCL CAD BI: CPT | Mod: 26

## 2020-02-05 PROCEDURE — 76642 ULTRASOUND BREAST LIMITED: CPT | Mod: 26,RT

## 2020-02-05 PROCEDURE — 76642 ULTRASOUND BREAST LIMITED: CPT

## 2020-02-05 PROCEDURE — 77066 DX MAMMO INCL CAD BI: CPT

## 2020-02-05 PROCEDURE — 77062 BREAST TOMOSYNTHESIS BI: CPT | Mod: 26

## 2020-02-05 PROCEDURE — G0279: CPT

## 2020-02-19 ENCOUNTER — APPOINTMENT (OUTPATIENT)
Dept: INTERNAL MEDICINE | Facility: CLINIC | Age: 45
End: 2020-02-19

## 2020-07-14 DIAGNOSIS — N63.0 UNSPECIFIED LUMP IN UNSPECIFIED BREAST: ICD-10-CM

## 2020-08-18 ENCOUNTER — APPOINTMENT (OUTPATIENT)
Dept: MAMMOGRAPHY | Facility: CLINIC | Age: 45
End: 2020-08-18
Payer: MEDICAID

## 2020-08-18 ENCOUNTER — RESULT REVIEW (OUTPATIENT)
Age: 45
End: 2020-08-18

## 2020-08-18 ENCOUNTER — APPOINTMENT (OUTPATIENT)
Dept: ULTRASOUND IMAGING | Facility: CLINIC | Age: 45
End: 2020-08-18
Payer: MEDICAID

## 2020-08-18 ENCOUNTER — OUTPATIENT (OUTPATIENT)
Dept: OUTPATIENT SERVICES | Facility: HOSPITAL | Age: 45
LOS: 1 days | End: 2020-08-18
Payer: MEDICAID

## 2020-08-18 DIAGNOSIS — Z90.710 ACQUIRED ABSENCE OF BOTH CERVIX AND UTERUS: Chronic | ICD-10-CM

## 2020-08-18 DIAGNOSIS — N63.0 UNSPECIFIED LUMP IN UNSPECIFIED BREAST: ICD-10-CM

## 2020-08-18 PROCEDURE — 76642 ULTRASOUND BREAST LIMITED: CPT

## 2020-08-18 PROCEDURE — 77065 DX MAMMO INCL CAD UNI: CPT

## 2020-08-18 PROCEDURE — 77065 DX MAMMO INCL CAD UNI: CPT | Mod: 26,RT

## 2020-08-18 PROCEDURE — 77061 BREAST TOMOSYNTHESIS UNI: CPT | Mod: 26

## 2020-08-18 PROCEDURE — G0279: CPT

## 2020-08-18 PROCEDURE — 76642 ULTRASOUND BREAST LIMITED: CPT | Mod: 26,RT

## 2020-09-28 ENCOUNTER — APPOINTMENT (OUTPATIENT)
Dept: FAMILY MEDICINE | Facility: CLINIC | Age: 45
End: 2020-09-28
Payer: MEDICAID

## 2020-09-28 VITALS
WEIGHT: 219 LBS | BODY MASS INDEX: 36.49 KG/M2 | OXYGEN SATURATION: 97 % | SYSTOLIC BLOOD PRESSURE: 146 MMHG | HEIGHT: 65 IN | HEART RATE: 105 BPM | DIASTOLIC BLOOD PRESSURE: 93 MMHG

## 2020-09-28 VITALS — TEMPERATURE: 97.9 F

## 2020-09-28 VITALS — DIASTOLIC BLOOD PRESSURE: 94 MMHG | HEART RATE: 100 BPM | SYSTOLIC BLOOD PRESSURE: 130 MMHG

## 2020-09-28 PROCEDURE — 99386 PREV VISIT NEW AGE 40-64: CPT | Mod: 25

## 2020-09-28 PROCEDURE — 36415 COLL VENOUS BLD VENIPUNCTURE: CPT

## 2020-09-28 PROCEDURE — 90686 IIV4 VACC NO PRSV 0.5 ML IM: CPT

## 2020-09-28 PROCEDURE — G0008: CPT

## 2020-09-28 RX ORDER — BLOOD PRESSURE TEST KIT-LARGE
KIT MISCELLANEOUS
Qty: 1 | Refills: 0 | Status: ACTIVE | COMMUNITY
Start: 2020-09-28 | End: 1900-01-01

## 2020-09-28 NOTE — HISTORY OF PRESENT ILLNESS
[FreeTextEntry1] : annual [de-identified] : 43 yo F with  hx endometrial ca s/p total hysterectomy, HTN, DM, HLD. \par \par diet-- watches carbs, portions. Eats fairly healthy-- no fried foods.\par exercise-- erratic schedule but walks dog 1-2 times a day.\par \par BP-- was on metoprolol in past. Was stopped, not sure why. No hx side effects that she is aware. HR very high for past year. TFTs last year normal. BP still high with amlodipine 2.5 and losartan 100mg. \par \par Follows with GYN-- mammo due in Nov. \par \par

## 2020-09-29 LAB
25(OH)D3 SERPL-MCNC: 31.7 NG/ML
ALBUMIN SERPL ELPH-MCNC: 4.8 G/DL
ALP BLD-CCNC: 64 U/L
ALT SERPL-CCNC: 39 U/L
ANION GAP SERPL CALC-SCNC: 19 MMOL/L
AST SERPL-CCNC: 19 U/L
BASOPHILS # BLD AUTO: 0.05 K/UL
BASOPHILS NFR BLD AUTO: 0.8 %
BILIRUB SERPL-MCNC: 0.2 MG/DL
BUN SERPL-MCNC: 16 MG/DL
CALCIUM SERPL-MCNC: 10.3 MG/DL
CHLORIDE SERPL-SCNC: 98 MMOL/L
CHOLEST SERPL-MCNC: 227 MG/DL
CHOLEST/HDLC SERPL: 4.5 RATIO
CO2 SERPL-SCNC: 24 MMOL/L
CREAT SERPL-MCNC: 0.49 MG/DL
CREAT SPEC-SCNC: 103 MG/DL
EOSINOPHIL # BLD AUTO: 0.15 K/UL
EOSINOPHIL NFR BLD AUTO: 2.4 %
ESTIMATED AVERAGE GLUCOSE: 186 MG/DL
GLUCOSE SERPL-MCNC: 183 MG/DL
HBA1C MFR BLD HPLC: 8.1 %
HCT VFR BLD CALC: 44 %
HDLC SERPL-MCNC: 50 MG/DL
HGB BLD-MCNC: 13.1 G/DL
IMM GRANULOCYTES NFR BLD AUTO: 0.3 %
LDLC SERPL CALC-MCNC: NORMAL MG/DL
LYMPHOCYTES # BLD AUTO: 1.51 K/UL
LYMPHOCYTES NFR BLD AUTO: 24.2 %
MAN DIFF?: NORMAL
MCHC RBC-ENTMCNC: 26.7 PG
MCHC RBC-ENTMCNC: 29.8 GM/DL
MCV RBC AUTO: 89.6 FL
MICROALBUMIN 24H UR DL<=1MG/L-MCNC: 7.1 MG/DL
MICROALBUMIN/CREAT 24H UR-RTO: 69 MG/G
MONOCYTES # BLD AUTO: 0.43 K/UL
MONOCYTES NFR BLD AUTO: 6.9 %
NEUTROPHILS # BLD AUTO: 4.08 K/UL
NEUTROPHILS NFR BLD AUTO: 65.4 %
PLATELET # BLD AUTO: 394 K/UL
POTASSIUM SERPL-SCNC: 4.7 MMOL/L
PROT SERPL-MCNC: 7.6 G/DL
RBC # BLD: 4.91 M/UL
RBC # FLD: 13.7 %
SODIUM SERPL-SCNC: 141 MMOL/L
TRIGL SERPL-MCNC: 403 MG/DL
TSH SERPL-ACNC: 1.01 UIU/ML
WBC # FLD AUTO: 6.24 K/UL

## 2020-09-29 RX ORDER — LANCETS 28 GAUGE
EACH MISCELLANEOUS
Qty: 1 | Refills: 3 | Status: ACTIVE | COMMUNITY
Start: 2019-05-13 | End: 1900-01-01

## 2020-09-29 RX ORDER — BLOOD-GLUCOSE METER
KIT MISCELLANEOUS
Qty: 1 | Refills: 3 | Status: ACTIVE | COMMUNITY
Start: 2019-05-13 | End: 1900-01-01

## 2020-11-02 ENCOUNTER — APPOINTMENT (OUTPATIENT)
Dept: FAMILY MEDICINE | Facility: CLINIC | Age: 45
End: 2020-11-02

## 2020-11-16 ENCOUNTER — APPOINTMENT (OUTPATIENT)
Dept: FAMILY MEDICINE | Facility: CLINIC | Age: 45
End: 2020-11-16

## 2020-11-23 ENCOUNTER — APPOINTMENT (OUTPATIENT)
Dept: OBGYN | Facility: CLINIC | Age: 45
End: 2020-11-23

## 2020-12-10 ENCOUNTER — APPOINTMENT (OUTPATIENT)
Dept: FAMILY MEDICINE | Facility: CLINIC | Age: 45
End: 2020-12-10
Payer: MEDICAID

## 2020-12-10 VITALS
WEIGHT: 220 LBS | BODY MASS INDEX: 36.65 KG/M2 | DIASTOLIC BLOOD PRESSURE: 93 MMHG | HEART RATE: 64 BPM | TEMPERATURE: 97.7 F | SYSTOLIC BLOOD PRESSURE: 142 MMHG | OXYGEN SATURATION: 96 % | HEIGHT: 65 IN

## 2020-12-10 VITALS — SYSTOLIC BLOOD PRESSURE: 130 MMHG | DIASTOLIC BLOOD PRESSURE: 90 MMHG

## 2020-12-10 PROCEDURE — 99214 OFFICE O/P EST MOD 30 MIN: CPT | Mod: 25

## 2020-12-10 PROCEDURE — 36415 COLL VENOUS BLD VENIPUNCTURE: CPT

## 2020-12-10 PROCEDURE — 99072 ADDL SUPL MATRL&STAF TM PHE: CPT

## 2020-12-10 NOTE — PHYSICAL EXAM
[Normal Gait] : normal gait [Normal] : affect was normal and insight and judgment were intact [Comprehensive Foot Exam Normal] : Right and left foot were examined and both feet are normal. No ulcers in either foot. Toes are normal and with full ROM.  Normal tactile sensation with monofilament testing throughout both feet

## 2020-12-10 NOTE — HISTORY OF PRESENT ILLNESS
[FreeTextEntry1] : DM, HTN f/u [de-identified] : 46 yo F with DM, HTN, HLD presents for f/u. Tolerating meds well. \par \par Having issues with insomnia after having hysterectomy. Having minor menopausal sx.\par \par Due for eye exam.\par \par HTN-- Bps at home lower-- 130s/80s-90.

## 2020-12-11 LAB
ALBUMIN SERPL ELPH-MCNC: 4.8 G/DL
ALP BLD-CCNC: 63 U/L
ALT SERPL-CCNC: 35 U/L
ANION GAP SERPL CALC-SCNC: 11 MMOL/L
AST SERPL-CCNC: 18 U/L
BILIRUB SERPL-MCNC: 0.3 MG/DL
BUN SERPL-MCNC: 16 MG/DL
CALCIUM SERPL-MCNC: 10.2 MG/DL
CHLORIDE SERPL-SCNC: 101 MMOL/L
CHOLEST SERPL-MCNC: 172 MG/DL
CO2 SERPL-SCNC: 27 MMOL/L
CREAT SERPL-MCNC: 0.58 MG/DL
ESTIMATED AVERAGE GLUCOSE: 197 MG/DL
GLUCOSE SERPL-MCNC: 175 MG/DL
HBA1C MFR BLD HPLC: 8.5 %
HDLC SERPL-MCNC: 51 MG/DL
LDLC SERPL CALC-MCNC: 77 MG/DL
NONHDLC SERPL-MCNC: 121 MG/DL
POTASSIUM SERPL-SCNC: 4.6 MMOL/L
PROT SERPL-MCNC: 7.5 G/DL
SODIUM SERPL-SCNC: 139 MMOL/L
TRIGL SERPL-MCNC: 219 MG/DL

## 2020-12-16 PROBLEM — N76.0 BACTERIAL VAGINITIS: Status: RESOLVED | Noted: 2018-10-26 | Resolved: 2020-12-16

## 2021-01-08 ENCOUNTER — NON-APPOINTMENT (OUTPATIENT)
Age: 46
End: 2021-01-08

## 2021-02-08 ENCOUNTER — APPOINTMENT (OUTPATIENT)
Dept: ORTHOPEDIC SURGERY | Facility: CLINIC | Age: 46
End: 2021-02-08

## 2021-03-02 DIAGNOSIS — M65.30 TRIGGER FINGER, UNSPECIFIED FINGER: ICD-10-CM

## 2021-03-08 ENCOUNTER — APPOINTMENT (OUTPATIENT)
Dept: ORTHOPEDIC SURGERY | Facility: CLINIC | Age: 46
End: 2021-03-08
Payer: MEDICAID

## 2021-03-08 VITALS
HEIGHT: 66 IN | WEIGHT: 225 LBS | HEART RATE: 103 BPM | DIASTOLIC BLOOD PRESSURE: 96 MMHG | SYSTOLIC BLOOD PRESSURE: 167 MMHG | BODY MASS INDEX: 36.16 KG/M2

## 2021-03-08 DIAGNOSIS — M77.11 LATERAL EPICONDYLITIS, RIGHT ELBOW: ICD-10-CM

## 2021-03-08 PROCEDURE — 99072 ADDL SUPL MATRL&STAF TM PHE: CPT

## 2021-03-08 PROCEDURE — 99204 OFFICE O/P NEW MOD 45 MIN: CPT | Mod: 25

## 2021-03-08 PROCEDURE — 20550 NJX 1 TENDON SHEATH/LIGAMENT: CPT | Mod: F7

## 2021-03-09 ENCOUNTER — RX RENEWAL (OUTPATIENT)
Age: 46
End: 2021-03-09

## 2021-03-15 ENCOUNTER — APPOINTMENT (OUTPATIENT)
Dept: FAMILY MEDICINE | Facility: CLINIC | Age: 46
End: 2021-03-15
Payer: MEDICAID

## 2021-03-15 VITALS
SYSTOLIC BLOOD PRESSURE: 155 MMHG | HEIGHT: 66 IN | HEART RATE: 83 BPM | DIASTOLIC BLOOD PRESSURE: 104 MMHG | TEMPERATURE: 97.8 F | WEIGHT: 227 LBS | BODY MASS INDEX: 36.48 KG/M2 | OXYGEN SATURATION: 99 %

## 2021-03-15 DIAGNOSIS — R53.83 OTHER FATIGUE: ICD-10-CM

## 2021-03-15 PROCEDURE — 99072 ADDL SUPL MATRL&STAF TM PHE: CPT

## 2021-03-15 PROCEDURE — 99214 OFFICE O/P EST MOD 30 MIN: CPT | Mod: 25

## 2021-03-15 NOTE — HISTORY OF PRESENT ILLNESS
[FreeTextEntry1] : f/u DM, HLD, HTN [de-identified] : BP-- checks at home, 120s/80s. Gets nervous at doctors offices. \par \par Had COVID in Jan. Has low back pain since then. Walking does help with pain but then gets weakness in lower extremities with prolonged walking. Starting PT for elbow tendonitis. \par \par exercise-- not much due to low back pain\par diet-- smell/taste coming back but has been hard. Pt trying her best.\par \par recently started on estradiol and progesterone by GYN. Also venlafaxine decreased to 37.5. Does not feel much difference. She eventually wants to come off venlafaxine. Currently taking 37.5 every other day for past few weeks. \par \par DM-- needs to get eyes checked. PSV UTD. Scheduled for COVID vaccine next month.

## 2021-03-16 LAB
ALBUMIN SERPL ELPH-MCNC: 4.5 G/DL
ALP BLD-CCNC: 60 U/L
ALT SERPL-CCNC: 34 U/L
ANION GAP SERPL CALC-SCNC: 12 MMOL/L
AST SERPL-CCNC: 36 U/L
BILIRUB SERPL-MCNC: 0.2 MG/DL
BUN SERPL-MCNC: 15 MG/DL
CALCIUM SERPL-MCNC: 9.8 MG/DL
CHLORIDE SERPL-SCNC: 100 MMOL/L
CHOLEST SERPL-MCNC: 161 MG/DL
CO2 SERPL-SCNC: 26 MMOL/L
CREAT SERPL-MCNC: 0.61 MG/DL
ESTIMATED AVERAGE GLUCOSE: 174 MG/DL
GLUCOSE SERPL-MCNC: 157 MG/DL
HBA1C MFR BLD HPLC: 7.7 %
HDLC SERPL-MCNC: 54 MG/DL
LDLC SERPL CALC-MCNC: 70 MG/DL
NONHDLC SERPL-MCNC: 108 MG/DL
POTASSIUM SERPL-SCNC: 4.3 MMOL/L
PROT SERPL-MCNC: 7.7 G/DL
SODIUM SERPL-SCNC: 138 MMOL/L
TRIGL SERPL-MCNC: 191 MG/DL
TSH SERPL-ACNC: 1.44 UIU/ML
VIT B12 SERPL-MCNC: 348 PG/ML

## 2021-04-09 ENCOUNTER — APPOINTMENT (OUTPATIENT)
Dept: ORTHOPEDIC SURGERY | Facility: CLINIC | Age: 46
End: 2021-04-09
Payer: MEDICAID

## 2021-04-09 VITALS — WEIGHT: 227 LBS | HEIGHT: 66 IN | BODY MASS INDEX: 36.48 KG/M2

## 2021-04-09 PROCEDURE — 99072 ADDL SUPL MATRL&STAF TM PHE: CPT

## 2021-04-09 PROCEDURE — 72100 X-RAY EXAM L-S SPINE 2/3 VWS: CPT

## 2021-04-09 PROCEDURE — 99214 OFFICE O/P EST MOD 30 MIN: CPT

## 2021-04-26 ENCOUNTER — OUTPATIENT (OUTPATIENT)
Dept: OUTPATIENT SERVICES | Facility: HOSPITAL | Age: 46
LOS: 1 days | End: 2021-04-26
Payer: MEDICAID

## 2021-04-26 ENCOUNTER — APPOINTMENT (OUTPATIENT)
Dept: RADIOLOGY | Facility: CLINIC | Age: 46
End: 2021-04-26
Payer: MEDICAID

## 2021-04-26 ENCOUNTER — APPOINTMENT (OUTPATIENT)
Dept: MAMMOGRAPHY | Facility: CLINIC | Age: 46
End: 2021-04-26
Payer: MEDICAID

## 2021-04-26 ENCOUNTER — APPOINTMENT (OUTPATIENT)
Dept: ULTRASOUND IMAGING | Facility: CLINIC | Age: 46
End: 2021-04-26
Payer: MEDICAID

## 2021-04-26 DIAGNOSIS — Z90.710 ACQUIRED ABSENCE OF BOTH CERVIX AND UTERUS: Chronic | ICD-10-CM

## 2021-04-26 DIAGNOSIS — N63.0 UNSPECIFIED LUMP IN UNSPECIFIED BREAST: ICD-10-CM

## 2021-04-26 DIAGNOSIS — Z00.8 ENCOUNTER FOR OTHER GENERAL EXAMINATION: ICD-10-CM

## 2021-04-26 PROCEDURE — 77063 BREAST TOMOSYNTHESIS BI: CPT | Mod: 26

## 2021-04-26 PROCEDURE — 77067 SCR MAMMO BI INCL CAD: CPT | Mod: 26

## 2021-04-26 PROCEDURE — 77067 SCR MAMMO BI INCL CAD: CPT

## 2021-04-26 PROCEDURE — 76641 ULTRASOUND BREAST COMPLETE: CPT | Mod: 26,50

## 2021-04-26 PROCEDURE — 77080 DXA BONE DENSITY AXIAL: CPT

## 2021-04-26 PROCEDURE — 77080 DXA BONE DENSITY AXIAL: CPT | Mod: 26

## 2021-04-26 PROCEDURE — 77063 BREAST TOMOSYNTHESIS BI: CPT

## 2021-04-26 PROCEDURE — 76641 ULTRASOUND BREAST COMPLETE: CPT

## 2021-06-01 ENCOUNTER — RX RENEWAL (OUTPATIENT)
Age: 46
End: 2021-06-01

## 2021-08-20 ENCOUNTER — NON-APPOINTMENT (OUTPATIENT)
Age: 46
End: 2021-08-20

## 2021-08-23 ENCOUNTER — APPOINTMENT (OUTPATIENT)
Dept: FAMILY MEDICINE | Facility: CLINIC | Age: 46
End: 2021-08-23

## 2021-08-24 ENCOUNTER — APPOINTMENT (OUTPATIENT)
Dept: ORTHOPEDIC SURGERY | Facility: CLINIC | Age: 46
End: 2021-08-24
Payer: MEDICAID

## 2021-08-24 PROCEDURE — 99214 OFFICE O/P EST MOD 30 MIN: CPT

## 2021-08-24 RX ORDER — IBUPROFEN 800 MG/1
800 TABLET, FILM COATED ORAL 3 TIMES DAILY
Qty: 90 | Refills: 1 | Status: ACTIVE | COMMUNITY
Start: 2021-08-24 | End: 1900-01-01

## 2021-08-24 NOTE — PHYSICAL EXAM
[UE/LE] : Motor: 5/5 motor strength in bilateral upper & lower extremities [] : Sensory: [L5-LT] : L5 [ALL] : dorsalis pedis, posterior tibial, femoral, popliteal, and radial 2+ and symmetric bilaterally [Normal] : Oriented to person, place, and time, insight and judgement were intact and the affect was normal [Guaman's Sign] : negative Guaman's sign [Pronator Drift] : negative pronator drift [SLR] : negative straight leg raise [de-identified] : Lumbar ROM: full, mild pain\par NTTP L spine and b/l paraspinals L region. \par Skin intact L spine. No rashes, ulcers, blisters. \par No lymphedema. \par Rapid alternating movements- Intact. \par Full and non-painful ROM RUE, LUE, RLE, and LLE. Skin intact RUE, LUE, RLE, and LLE. No rashes, blisters, ulcers. NTTP RUE, LUE, RLE, and LLE. No evidence of dislocation or subluxation B/L.\par  [de-identified] : 2 views AP and Lat of LS Spine from T10-Pelvis 04/09/21 . Read and dictated by Dr. John Thompson Board Certified orthopaedic surgeon L5S1 DDD, L34 Spondylolisthesis\par \par \par EXAM: LUMBAR SACRAL SPINE MRI [facility] \par PROCEDURE DATE: Mar 13 2016 \par INTERPRETATION: EXAMINATION: MRI lumbar spine without contrast \par CLINICAL INFORMATION: Low back pain and weakness in both legs \par TECHNIQUE: Multiplanar, multisequential MR imaging was performed. \par FINDINGS: The conus terminates at the L1-2 level and is normal in signal. \par Vertebral body heights are maintained. There is a minimal levoscoliosis. \par There is multilevel disc degeneration. There is moderate to severe disc \par height loss at L5-S1. \par T10-T11, T11-T12: Minimal diffuse disc bulges. No central canal or foraminal \par stenosis. \par T12-L1: Right paracentral disc protrusion which effaces the right anterior \par thecal sac. This is superimposed on a minimal diffuse disc bulge. There is \par no central canal or foraminal stenosis. \par L1-L2: No central canal or foraminal stenosis. \par L2-L3: Minimal diffuse disc bulge. No central canal or foraminal stenosis. \par L3-L4: Left paracentral-foraminal disc extrusion with cranial extension of \par disc material to the level of the mid aspect of the L3 vertebral body. This \par contacts the exiting left L3 nerve root. This is superimposed on a minimal \par diffuse disc bulge. There is mild right foraminal stenosis. \par L4-L5: Small central disc protrusion superimposed on a minimal diffuse disc \par bulge. Mild bilateral facet arthropathy. Mild bilateral foraminal stenoses. \par L5-S1: Central disc extrusion with caudal extension of disc material to the \par level of the mid aspect of the S1 vertebral body. This abuts the descending \par left S1 nerve root. This is superimposed on a diffuse disc bulge with \par osseous ridging. There are moderate bilateral foraminal stenoses. \par IMPRESSION: Left paracentral-foraminal disc extrusion with cranial extension \par at L3-L4. This contacts the exiting left L3 nerve root. \par Central disc extrusion with caudal extension at L5-S1. This abuts the \par descending left S1 nerve root. Moderate bilateral L5-S1 foraminal stenoses. \par Right paracentral disc protrusion at T11-T12 which effaces the right \par anterior thecal sac. \par GORDO CERON M.D., ATTENDING RADIOLOGIST \par This document has been electronically signed. Mar 14 2016 10:56AM

## 2021-08-24 NOTE — DISCUSSION/SUMMARY
[de-identified] : 46 yo female with L5S1 DDD, Spondylolisthesis L34, no relief with PT, NSAIDS, recommend MRI of L Spine, recommend pain consult. RTO 4-6 weeks. \par \par Diagnosis, prognosis, natural history and treatment was discussed with patient. Patient was advised if the following symptoms develop: chills, fever, \par loss of bladder control, bowel incontinence or urinary retention, numbness/tingling or weakness is present in upper or lower extremities, to go to the nearest emergency room. This may be a new clinical condition not present at the time of the patient visit  that may lead to paralysis and/or death, Patient advised if the above symptoms developed to also call the office immediately to inform us and to go to the nearest emergency room.\par

## 2021-08-24 NOTE — HISTORY OF PRESENT ILLNESS
[Prolonged Standing] : worsened by prolonged standing [Standing] : worsened by standing [Walking] : worsened by walking [NSAIDs] : relieved by nonsteroidal anti-inflammatory drugs [de-identified] : Pt with chronic low back pain for several years, seen by Dr Mckeon in 2016 presents with c/o constant low back pain since 03/04/21, after bending down. She has done 7 PT sessions, which has minimally alleviated her symptoms. NSAIDS and Gabapentin with minimal relief. \par \par Prior HPI:  Pt denies recent injury/falls. Pain is described as throbbing/stabbing pain. Pain radiates to left buttock,  left hip and lateral aspect of left thigh, numbness on lateral aspect of B/L Thighs for the past 10 yrs.  Pt denies tingling, or weakness on B/L LE. Pt takes Motrin and Advil 800mg, these provides some pain relief. Pt had  LESI X2 in 2016, this provided 90% pain relief until 2020, does not participate with PT at this time. Pt had Covid-19 on 01/2021. Pt denies fever, chills, weight changes, loss of bladder control, bowel incontinence or urinary retention or saddle anesthesia\par The patient's past medical history, past surgical history, medications, allergies, and social history were reviewed by me today with the patient and documented accordingly. In addition, the patient's family history, which is noncontributory to this visit, was also reviewed.\par  [Ataxia] : no ataxia [Incontinence] : no incontinence [Loss of Dexterity] : good dexterity [Urinary Ret.] : no urinary retention

## 2021-08-27 ENCOUNTER — NON-APPOINTMENT (OUTPATIENT)
Age: 46
End: 2021-08-27

## 2021-08-30 NOTE — REVIEW OF SYSTEMS
[Fever] : no fever [Chills] : no chills [Chest Pain] : no chest pain [SOB on Exertion] : no shortness of breath during exertion [Dyspnea] : no shortness of breath [Abdominal Pain] : no abdominal pain [Dysuria] : no dysuria [Pelvic Pain] : no pelvic pain [Abn Vag Bleeding] : no abnormal vaginal bleeding 30-Aug-2021 22:44

## 2021-08-31 ENCOUNTER — LABORATORY RESULT (OUTPATIENT)
Age: 46
End: 2021-08-31

## 2021-08-31 ENCOUNTER — APPOINTMENT (OUTPATIENT)
Dept: CARDIOLOGY | Facility: CLINIC | Age: 46
End: 2021-08-31
Payer: MEDICAID

## 2021-08-31 ENCOUNTER — NON-APPOINTMENT (OUTPATIENT)
Age: 46
End: 2021-08-31

## 2021-08-31 VITALS
DIASTOLIC BLOOD PRESSURE: 80 MMHG | HEART RATE: 87 BPM | OXYGEN SATURATION: 99 % | HEIGHT: 66 IN | RESPIRATION RATE: 15 BRPM | WEIGHT: 227 LBS | SYSTOLIC BLOOD PRESSURE: 134 MMHG | BODY MASS INDEX: 36.48 KG/M2

## 2021-08-31 PROCEDURE — 36415 COLL VENOUS BLD VENIPUNCTURE: CPT

## 2021-08-31 PROCEDURE — 99205 OFFICE O/P NEW HI 60 MIN: CPT

## 2021-08-31 PROCEDURE — 93000 ELECTROCARDIOGRAM COMPLETE: CPT

## 2021-08-31 RX ORDER — GABAPENTIN 100 MG/1
100 CAPSULE ORAL
Qty: 90 | Refills: 0 | Status: DISCONTINUED | COMMUNITY
Start: 2021-04-09 | End: 2021-08-31

## 2021-08-31 RX ORDER — MELOXICAM 15 MG/1
15 TABLET ORAL
Qty: 30 | Refills: 1 | Status: DISCONTINUED | COMMUNITY
Start: 2021-04-09 | End: 2021-08-31

## 2021-08-31 RX ORDER — VENLAFAXINE HYDROCHLORIDE 75 MG/1
75 CAPSULE, EXTENDED RELEASE ORAL DAILY
Qty: 90 | Refills: 3 | Status: DISCONTINUED | COMMUNITY
Start: 2017-10-23 | End: 2021-08-31

## 2021-09-03 LAB
ALBUMIN SERPL ELPH-MCNC: 4.3 G/DL
ALP BLD-CCNC: 66 U/L
ALT SERPL-CCNC: 17 U/L
ANION GAP SERPL CALC-SCNC: 15 MMOL/L
AST SERPL-CCNC: 17 U/L
BASOPHILS # BLD AUTO: 0.07 K/UL
BASOPHILS NFR BLD AUTO: 0.8 %
BILIRUB SERPL-MCNC: <0.2 MG/DL
BUN SERPL-MCNC: 13 MG/DL
CALCIUM SERPL-MCNC: 9.8 MG/DL
CHLORIDE SERPL-SCNC: 103 MMOL/L
CHOLEST SERPL-MCNC: 144 MG/DL
CO2 SERPL-SCNC: 23 MMOL/L
CREAT SERPL-MCNC: 0.56 MG/DL
EOSINOPHIL # BLD AUTO: 0.25 K/UL
EOSINOPHIL NFR BLD AUTO: 3 %
ESTIMATED AVERAGE GLUCOSE: 192 MG/DL
GLUCOSE SERPL-MCNC: 232 MG/DL
HBA1C MFR BLD HPLC: 8.3 %
HCT VFR BLD CALC: 39.6 %
HDLC SERPL-MCNC: 43 MG/DL
HGB BLD-MCNC: 12.2 G/DL
IMM GRANULOCYTES NFR BLD AUTO: 0.2 %
LDLC SERPL CALC-MCNC: 50 MG/DL
LDLC SERPL DIRECT ASSAY-MCNC: 72 MG/DL
LYMPHOCYTES # BLD AUTO: 1.88 K/UL
LYMPHOCYTES NFR BLD AUTO: 22.8 %
MAN DIFF?: NORMAL
MCHC RBC-ENTMCNC: 26.1 PG
MCHC RBC-ENTMCNC: 30.8 GM/DL
MCV RBC AUTO: 84.6 FL
MONOCYTES # BLD AUTO: 0.53 K/UL
MONOCYTES NFR BLD AUTO: 6.4 %
NEUTROPHILS # BLD AUTO: 5.49 K/UL
NEUTROPHILS NFR BLD AUTO: 66.8 %
NONHDLC SERPL-MCNC: 101 MG/DL
PLATELET # BLD AUTO: 404 K/UL
POTASSIUM SERPL-SCNC: 4.2 MMOL/L
PROT SERPL-MCNC: 7.1 G/DL
RBC # BLD: 4.68 M/UL
RBC # FLD: 13.8 %
SODIUM SERPL-SCNC: 140 MMOL/L
T3 SERPL-MCNC: 130 NG/DL
T4 SERPL-MCNC: 11 UG/DL
TRIGL SERPL-MCNC: 254 MG/DL
TSH SERPL-ACNC: 0.98 UIU/ML
WBC # FLD AUTO: 8.24 K/UL

## 2021-09-03 NOTE — PHYSICAL EXAM
[Well Developed] : well developed [Well Nourished] : well nourished [No Acute Distress] : no acute distress [Normal Conjunctiva] : normal conjunctiva [Normal Venous Pressure] : normal venous pressure [Normal S1, S2] : normal S1, S2 [No Murmur] : no murmur [No Rub] : no rub [No Gallop] : no gallop [Clear Lung Fields] : clear lung fields [Good Air Entry] : good air entry [No Respiratory Distress] : no respiratory distress  [Soft] : abdomen soft [Non Tender] : non-tender [No Masses/organomegaly] : no masses/organomegaly [Normal Bowel Sounds] : normal bowel sounds [Normal Gait] : normal gait [No Edema] : no edema [No Cyanosis] : no cyanosis [No Clubbing] : no clubbing [No Varicosities] : no varicosities [No Rash] : no rash [Moves all extremities] : moves all extremities [No Focal Deficits] : no focal deficits [Normal Speech] : normal speech [Alert and Oriented] : alert and oriented [Normal memory] : normal memory [No Xanthelasma] : no xanthelasma [de-identified] : Normal carotid upstrokes without bruit.  [de-identified] : PMI not displaced.   [de-identified] : No bruit.

## 2021-09-03 NOTE — HISTORY OF PRESENT ILLNESS
[FreeTextEntry1] : She is 45 years old.  She has a hx. of HTN, HLD and DM.\par \par Several years ago, when evaluated in an emergency room after a fall, she was noted to have an elevated heart rate.  She was treated with metoprolol for several years, and this was subsequently stopped by an internist, but later restarted.  She tolerates metoprolol without any significant adverse effect, other than occasional mild fatigue.  She has never had a formal cardiac work-up in the past.\par \par In January of this year, she suffered a Covid infection and was quite sick at home, although did not require hospitalization.  In that setting, she was more aware of an elevated heart rate.  Fortunately, she recovered from this illness, although still does not feeling completely back to normal.\par \par She reports no family history of heart disease.  Her mother suffers from hypertension, and her father had a history of elevated cholesterol diabetes and  at age 61; he had a hx of non-Hodgkin's lymphoma and may have suffered a pulmonary embolism.  A younger brother has diabetes.\par \par She has never been a cigarette smoker.  She remains active and describes no exertional dyspnea or chest discomfort with her activities.  She reports no palpitations.  She has never suffered loss of consciousness.

## 2021-09-03 NOTE — REASON FOR VISIT
[FreeTextEntry1] : \patrick Evanse Mina presents for cardiac evaluation regarding elevated heart rate.

## 2021-09-03 NOTE — ASSESSMENT
[FreeTextEntry1] : \par Hx. of tachycardia, apparently ST, maintained on metoprolol XL\par \par HTN\par \par HLD\par \par DM

## 2021-09-03 NOTE — DISCUSSION/SUMMARY
[FreeTextEntry1] : \par Hx. of tachycardia, apparently ST, maintained on metoprolol XL -suspect in part related to weight and deconditioning.  COVID-19 infection earlier this year may also been a contributing factor.  \par - Will have her return for an echocardiogram to assess cardiac structure and function, given her hypertensive history and history of COVID-19.  \par - Encouraged her to try to lose weight and remain physically active; will check to see if her insurance permits a consult with a dietitian, as she would benefit from guidance regarding her diabetic history and her excess weight.\par    \par HTN - Blood pressure control seems reasonable at this time.  She will continue her current regimen of medications.\par \par HLD - She will continue on atorvastatin.  We will check a lipid profile.  She does have occasional muscle aches, and we will add a creatine kinase level as well.\par \par DM - continue her usual medicines.  As above will clarify whether dietary consult might be available.\par \par As her primary care doctor is on maternity leave, we will send a new prescription for her hypertensive meds and for the metoprolol.\par \par Will send out blood work.\par \par I asked her to return in 6 weeks for an office visit and echocardiogram.

## 2021-09-08 ENCOUNTER — APPOINTMENT (OUTPATIENT)
Dept: INTERNAL MEDICINE | Facility: CLINIC | Age: 46
End: 2021-09-08
Payer: MEDICAID

## 2021-09-08 VITALS
HEART RATE: 100 BPM | TEMPERATURE: 97.9 F | BODY MASS INDEX: 37.77 KG/M2 | WEIGHT: 235 LBS | SYSTOLIC BLOOD PRESSURE: 140 MMHG | HEIGHT: 66 IN | OXYGEN SATURATION: 98 % | DIASTOLIC BLOOD PRESSURE: 91 MMHG

## 2021-09-08 DIAGNOSIS — Z79.899 OTHER LONG TERM (CURRENT) DRUG THERAPY: ICD-10-CM

## 2021-09-08 DIAGNOSIS — G47.00 INSOMNIA, UNSPECIFIED: ICD-10-CM

## 2021-09-08 PROCEDURE — 99214 OFFICE O/P EST MOD 30 MIN: CPT

## 2021-09-09 NOTE — PHYSICAL EXAM
[No Edema] : there was no peripheral edema [Normal] : affect was normal and insight and judgment were intact [de-identified] : obese

## 2021-09-09 NOTE — HISTORY OF PRESENT ILLNESS
[FreeTextEntry1] : f/u meds refill [de-identified] : 44 yo w/ h/o endometrial CA, s/p COURT/BSO 2012, menopausal symptoms, T2DM, here for med refills. Just saw cardiology, BP meds refilled, no h/o heart disease, h/o HTN and tachycardia, managed with meds including BB, echo was ordered. Also had all labs drawn there including A1c and others. Pt reports she needs her DM meds and hydroxyzine, which she takes for insomnia due to menopause, works well, uses 25 mg most nights, occasionally 50 mg. Plans to self taper venlafaxine as pt feels this really did not help her hot flashes at all. 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 States glimepiride was added about a year ago, has been on metformin also. Admits to recent weight gain due to stress and COVID. Did get vaccine. Gets no regular exercise.\par No history of pancreatitis or thyroid cancer. \par

## 2021-09-15 ENCOUNTER — APPOINTMENT (OUTPATIENT)
Dept: MRI IMAGING | Facility: IMAGING CENTER | Age: 46
End: 2021-09-15
Payer: MEDICAID

## 2021-09-15 ENCOUNTER — OUTPATIENT (OUTPATIENT)
Dept: OUTPATIENT SERVICES | Facility: HOSPITAL | Age: 46
LOS: 1 days | End: 2021-09-15
Payer: MEDICAID

## 2021-09-15 DIAGNOSIS — M54.16 RADICULOPATHY, LUMBAR REGION: ICD-10-CM

## 2021-09-15 DIAGNOSIS — Z90.710 ACQUIRED ABSENCE OF BOTH CERVIX AND UTERUS: Chronic | ICD-10-CM

## 2021-09-15 PROCEDURE — 72148 MRI LUMBAR SPINE W/O DYE: CPT | Mod: 26

## 2021-09-15 PROCEDURE — 72148 MRI LUMBAR SPINE W/O DYE: CPT

## 2021-09-20 ENCOUNTER — RX CHANGE (OUTPATIENT)
Age: 46
End: 2021-09-20

## 2021-09-28 ENCOUNTER — APPOINTMENT (OUTPATIENT)
Dept: ORTHOPEDIC SURGERY | Facility: CLINIC | Age: 46
End: 2021-09-28

## 2021-09-28 RX ORDER — IRBESARTAN 300 MG/1
300 TABLET ORAL
Qty: 90 | Refills: 1 | Status: DISCONTINUED | COMMUNITY
Start: 2021-09-24 | End: 2021-09-28

## 2021-10-18 ENCOUNTER — APPOINTMENT (OUTPATIENT)
Dept: CARDIOLOGY | Facility: CLINIC | Age: 46
End: 2021-10-18
Payer: MEDICAID

## 2021-10-18 PROCEDURE — 93306 TTE W/DOPPLER COMPLETE: CPT

## 2021-10-19 ENCOUNTER — NON-APPOINTMENT (OUTPATIENT)
Age: 46
End: 2021-10-19

## 2021-10-20 ENCOUNTER — APPOINTMENT (OUTPATIENT)
Dept: INTERNAL MEDICINE | Facility: CLINIC | Age: 46
End: 2021-10-20

## 2021-10-26 ENCOUNTER — APPOINTMENT (OUTPATIENT)
Dept: INTERNAL MEDICINE | Facility: CLINIC | Age: 46
End: 2021-10-26
Payer: MEDICAID

## 2021-10-26 VITALS
TEMPERATURE: 97.3 F | OXYGEN SATURATION: 98 % | HEART RATE: 104 BPM | SYSTOLIC BLOOD PRESSURE: 137 MMHG | WEIGHT: 233 LBS | BODY MASS INDEX: 37.45 KG/M2 | HEIGHT: 66 IN | DIASTOLIC BLOOD PRESSURE: 77 MMHG

## 2021-10-26 DIAGNOSIS — R00.0 TACHYCARDIA, UNSPECIFIED: ICD-10-CM

## 2021-10-26 PROCEDURE — 99214 OFFICE O/P EST MOD 30 MIN: CPT | Mod: 25

## 2021-10-26 PROCEDURE — 90686 IIV4 VACC NO PRSV 0.5 ML IM: CPT

## 2021-10-26 PROCEDURE — G0008: CPT

## 2021-10-26 PROCEDURE — 83036 HEMOGLOBIN GLYCOSYLATED A1C: CPT | Mod: QW

## 2021-10-26 NOTE — HISTORY OF PRESENT ILLNESS
[FreeTextEntry1] : f/u DM [de-identified] : 44 yo F T2DM. Pt taking Rybelsus, was able to get a month's supply, noted initial diarrhea worsening for about a week, now better and markedly decreased appetite. However, insurance will not cover further, so unclear what to do. Pt reports that she saw the cardiologist, records show Echo Oct 2021 WNL, so she was not given any additional rx, with the thinking that her tachycardia may be a sequelae of her prior COVID infection. Pt had a few questions about the booster shots. Otherwise no issues today.

## 2021-10-28 LAB — HBA1C MFR BLD HPLC: 8

## 2021-11-22 ENCOUNTER — APPOINTMENT (OUTPATIENT)
Dept: MRI IMAGING | Facility: CLINIC | Age: 46
End: 2021-11-22
Payer: MEDICAID

## 2021-11-22 ENCOUNTER — OUTPATIENT (OUTPATIENT)
Dept: OUTPATIENT SERVICES | Facility: HOSPITAL | Age: 46
LOS: 1 days | End: 2021-11-22
Payer: MEDICAID

## 2021-11-22 DIAGNOSIS — Z00.8 ENCOUNTER FOR OTHER GENERAL EXAMINATION: ICD-10-CM

## 2021-11-22 DIAGNOSIS — Z90.710 ACQUIRED ABSENCE OF BOTH CERVIX AND UTERUS: Chronic | ICD-10-CM

## 2021-11-22 PROCEDURE — 72195 MRI PELVIS W/O DYE: CPT | Mod: 26

## 2021-11-22 PROCEDURE — 72195 MRI PELVIS W/O DYE: CPT

## 2022-01-04 ENCOUNTER — NON-APPOINTMENT (OUTPATIENT)
Age: 47
End: 2022-01-04

## 2022-01-14 ENCOUNTER — RX RENEWAL (OUTPATIENT)
Age: 47
End: 2022-01-14

## 2022-01-14 ENCOUNTER — APPOINTMENT (OUTPATIENT)
Dept: CARDIOLOGY | Facility: CLINIC | Age: 47
End: 2022-01-14

## 2022-01-17 NOTE — ED ADULT NURSE NOTE - TEMPLATE LIST FOR HEAD TO TOE ASSESSMENT
"Group Therapy Documentation    PATIENT'S NAME: Boyd Ruiz  MRN:   [id number]  :   2004  ACCT. NUMBER: [id number]  DATE OF SERVICE: 22  START TIME:  9:00 AM  END TIME: 11:00 AM  FACILITATOR(S): Guille Yates; Tiana Márquez LADC; Rashel Marroquin  TOPIC: BEH Group Therapy  Number of patients attending the group:  8  Group Length:  2 Hours    Dimensions addressed 3, 4, 5, and 6    Summary of Group / Topics Discussed:    Group Therapy/Process Group:  Dual Process Group  Clients engaged in two hour dual process group focusing on the following topics:    Weekly Goals     Family Conflict     Trust    Honesty  Clients were encouraged to share personal experiences with the group and receive feedback. Peers were also encouraged to offer appropriate feedback to one another.        Group Attendance:  Attended group session    Patient's response to the group topic/interactions:  cooperative with task, discussed personal experience with topic, gave appropriate feedback to peers and listened actively    Patient appeared to be Actively participating, Attentive and Engaged.       Client specific details:  The client processed his weekly goals.  The client described feeling uncomfortable asking friends \"to hang out with just me.\" Peers and staff challenged the clients thinking and shared examples of unsupervised visits with friends going well.         " General

## 2022-01-24 ENCOUNTER — RX RENEWAL (OUTPATIENT)
Age: 47
End: 2022-01-24

## 2022-02-15 ENCOUNTER — RX RENEWAL (OUTPATIENT)
Age: 47
End: 2022-02-15

## 2022-03-07 ENCOUNTER — LABORATORY RESULT (OUTPATIENT)
Age: 47
End: 2022-03-07

## 2022-03-07 ENCOUNTER — APPOINTMENT (OUTPATIENT)
Dept: FAMILY MEDICINE | Facility: CLINIC | Age: 47
End: 2022-03-07
Payer: MEDICAID

## 2022-03-07 VITALS
HEART RATE: 84 BPM | OXYGEN SATURATION: 99 % | BODY MASS INDEX: 37.77 KG/M2 | DIASTOLIC BLOOD PRESSURE: 88 MMHG | WEIGHT: 235 LBS | HEIGHT: 66 IN | SYSTOLIC BLOOD PRESSURE: 136 MMHG | TEMPERATURE: 98.2 F

## 2022-03-07 VITALS — DIASTOLIC BLOOD PRESSURE: 80 MMHG | SYSTOLIC BLOOD PRESSURE: 138 MMHG

## 2022-03-07 PROCEDURE — 90715 TDAP VACCINE 7 YRS/> IM: CPT

## 2022-03-07 PROCEDURE — 99396 PREV VISIT EST AGE 40-64: CPT | Mod: 25

## 2022-03-07 PROCEDURE — 90471 IMMUNIZATION ADMIN: CPT

## 2022-03-07 RX ORDER — METOPROLOL SUCCINATE 25 MG/1
25 TABLET, EXTENDED RELEASE ORAL
Qty: 90 | Refills: 3 | Status: COMPLETED | COMMUNITY
Start: 2022-01-24 | End: 2022-03-07

## 2022-03-07 RX ORDER — ORAL SEMAGLUTIDE 3 MG/1
3 TABLET ORAL
Qty: 30 | Refills: 2 | Status: COMPLETED | COMMUNITY
Start: 2021-09-08 | End: 2022-03-07

## 2022-03-07 RX ORDER — VENLAFAXINE 37.5 MG/1
37.5 TABLET ORAL DAILY
Refills: 0 | Status: COMPLETED | COMMUNITY
End: 2022-03-07

## 2022-03-07 NOTE — HISTORY OF PRESENT ILLNESS
[FreeTextEntry1] : annual [de-identified] : 47 yo F with DM, HLD, HTN presents for annual.\par \par DM-- last eye exam 2018. Was started on Rybelsus but insurance only covered 1 month. Will try injectable.\par \par HTN-- amlodipine and metoprolol doses were increased. \par \par pt gained weight. Caring for mom/sister who recently dx uterine cancer. \par \par hip/groin pain-- saw pain management, has some degeneration in hip. Doing exercises for it. Takes motrin if severe-- only on occasion.

## 2022-03-07 NOTE — HEALTH RISK ASSESSMENT
[Patient reported mammogram was normal] : Patient reported mammogram was normal [Patient reported PAP Smear was normal] : Patient reported PAP Smear was normal [MammogramDate] : 05/21 [PapSmearDate] : 2/22

## 2022-03-08 LAB
25(OH)D3 SERPL-MCNC: 34.4 NG/ML
ALBUMIN SERPL ELPH-MCNC: 4.7 G/DL
ALP BLD-CCNC: 61 U/L
ALT SERPL-CCNC: 15 U/L
ANION GAP SERPL CALC-SCNC: 16 MMOL/L
AST SERPL-CCNC: 21 U/L
BASOPHILS # BLD AUTO: 0.06 K/UL
BASOPHILS NFR BLD AUTO: 0.7 %
BILIRUB SERPL-MCNC: 0.2 MG/DL
BUN SERPL-MCNC: 11 MG/DL
CALCIUM SERPL-MCNC: 10.5 MG/DL
CHLORIDE SERPL-SCNC: 99 MMOL/L
CHOLEST SERPL-MCNC: 178 MG/DL
CO2 SERPL-SCNC: 24 MMOL/L
CREAT SERPL-MCNC: 0.47 MG/DL
CREAT SPEC-SCNC: 16 MG/DL
EGFR: 120 ML/MIN/1.73M2
EOSINOPHIL # BLD AUTO: 0.28 K/UL
EOSINOPHIL NFR BLD AUTO: 3.2 %
ESTIMATED AVERAGE GLUCOSE: 180 MG/DL
GLUCOSE SERPL-MCNC: 116 MG/DL
HBA1C MFR BLD HPLC: 7.9 %
HCT VFR BLD CALC: 43.5 %
HDLC SERPL-MCNC: 46 MG/DL
HGB BLD-MCNC: 13.4 G/DL
IMM GRANULOCYTES NFR BLD AUTO: 0.3 %
LDLC SERPL CALC-MCNC: NORMAL MG/DL
LYMPHOCYTES # BLD AUTO: 2.07 K/UL
LYMPHOCYTES NFR BLD AUTO: 24 %
MAN DIFF?: NORMAL
MCHC RBC-ENTMCNC: 26.6 PG
MCHC RBC-ENTMCNC: 30.8 GM/DL
MCV RBC AUTO: 86.5 FL
MICROALBUMIN 24H UR DL<=1MG/L-MCNC: <1.2 MG/DL
MICROALBUMIN/CREAT 24H UR-RTO: NORMAL MG/G
MONOCYTES # BLD AUTO: 0.63 K/UL
MONOCYTES NFR BLD AUTO: 7.3 %
NEUTROPHILS # BLD AUTO: 5.57 K/UL
NEUTROPHILS NFR BLD AUTO: 64.5 %
NONHDLC SERPL-MCNC: 132 MG/DL
PLATELET # BLD AUTO: 408 K/UL
POTASSIUM SERPL-SCNC: 4.2 MMOL/L
PROT SERPL-MCNC: 7.8 G/DL
RBC # BLD: 5.03 M/UL
RBC # FLD: 13.8 %
SODIUM SERPL-SCNC: 139 MMOL/L
TRIGL SERPL-MCNC: 413 MG/DL
TSH SERPL-ACNC: 0.79 UIU/ML
VIT B12 SERPL-MCNC: 864 PG/ML
WBC # FLD AUTO: 8.64 K/UL

## 2022-03-10 ENCOUNTER — NON-APPOINTMENT (OUTPATIENT)
Age: 47
End: 2022-03-10

## 2022-03-21 ENCOUNTER — RX RENEWAL (OUTPATIENT)
Age: 47
End: 2022-03-21

## 2022-04-06 ENCOUNTER — NON-APPOINTMENT (OUTPATIENT)
Age: 47
End: 2022-04-06

## 2022-04-13 ENCOUNTER — APPOINTMENT (OUTPATIENT)
Dept: GASTROENTEROLOGY | Facility: CLINIC | Age: 47
End: 2022-04-13
Payer: MEDICAID

## 2022-04-13 VITALS — HEIGHT: 66 IN | WEIGHT: 232 LBS | BODY MASS INDEX: 37.28 KG/M2

## 2022-04-13 VITALS — HEART RATE: 70 BPM | DIASTOLIC BLOOD PRESSURE: 80 MMHG | SYSTOLIC BLOOD PRESSURE: 130 MMHG | RESPIRATION RATE: 12 BRPM

## 2022-04-13 DIAGNOSIS — Z12.11 ENCOUNTER FOR SCREENING FOR MALIGNANT NEOPLASM OF COLON: ICD-10-CM

## 2022-04-13 DIAGNOSIS — Z85.42 PERSONAL HISTORY OF MALIGNANT NEOPLASM OF OTHER PARTS OF UTERUS: ICD-10-CM

## 2022-04-13 DIAGNOSIS — J32.9 CHRONIC SINUSITIS, UNSPECIFIED: ICD-10-CM

## 2022-04-13 DIAGNOSIS — K58.9 IRRITABLE BOWEL SYNDROME W/OUT DIARRHEA: ICD-10-CM

## 2022-04-13 DIAGNOSIS — K29.70 GASTRITIS, UNSPECIFIED, W/OUT BLEEDING: ICD-10-CM

## 2022-04-13 PROCEDURE — 99203 OFFICE O/P NEW LOW 30 MIN: CPT

## 2022-04-13 RX ORDER — SODIUM SULFATE, POTASSIUM SULFATE, MAGNESIUM SULFATE 17.5; 3.13; 1.6 G/ML; G/ML; G/ML
17.5-3.13-1.6 SOLUTION, CONCENTRATE ORAL
Qty: 354 | Refills: 0 | Status: ACTIVE | COMMUNITY
Start: 2022-04-13 | End: 1900-01-01

## 2022-04-13 NOTE — PHYSICAL EXAM
[General Appearance - Alert] : alert [General Appearance - In No Acute Distress] : in no acute distress [Sclera] : the sclera and conjunctiva were normal [PERRL With Normal Accommodation] : pupils were equal in size, round, and reactive to light [Extraocular Movements] : extraocular movements were intact [Outer Ear] : the ears and nose were normal in appearance [Oropharynx] : the oropharynx was normal [Neck Appearance] : the appearance of the neck was normal [Neck Cervical Mass (___cm)] : no neck mass was observed [Jugular Venous Distention Increased] : there was no jugular-venous distention [Thyroid Nodule] : there were no palpable thyroid nodules [Thyroid Diffuse Enlargement] : the thyroid was not enlarged [] : no respiratory distress [Auscultation Breath Sounds / Voice Sounds] : lungs were clear to auscultation bilaterally [Heart Rate And Rhythm] : heart rate was normal and rhythm regular [Heart Sounds] : normal S1 and S2 [Heart Sounds Gallop] : no gallops [Murmurs] : no murmurs [Heart Sounds Pericardial Friction Rub] : no pericardial rub [Cervical Lymph Nodes Enlarged Posterior Bilaterally] : posterior cervical [Cervical Lymph Nodes Enlarged Anterior Bilaterally] : anterior cervical [Supraclavicular Lymph Nodes Enlarged Bilaterally] : supraclavicular [Femoral Lymph Nodes Enlarged Bilaterally] : femoral [Axillary Lymph Nodes Enlarged Bilaterally] : axillary [Inguinal Lymph Nodes Enlarged Bilaterally] : inguinal [Abnormal Walk] : normal gait [Nail Clubbing] : no clubbing  or cyanosis of the fingernails [Musculoskeletal - Swelling] : no joint swelling seen [Motor Tone] : muscle strength and tone were normal [Deep Tendon Reflexes (DTR)] : deep tendon reflexes were 2+ and symmetric [Sensation] : the sensory exam was normal to light touch and pinprick [No Focal Deficits] : no focal deficits [Oriented To Time, Place, And Person] : oriented to person, place, and time [Impaired Insight] : insight and judgment were intact [Affect] : the affect was normal

## 2022-05-02 ENCOUNTER — TRANSCRIPTION ENCOUNTER (OUTPATIENT)
Age: 47
End: 2022-05-02

## 2022-05-02 ENCOUNTER — APPOINTMENT (OUTPATIENT)
Dept: MAMMOGRAPHY | Facility: IMAGING CENTER | Age: 47
End: 2022-05-02
Payer: MEDICAID

## 2022-05-02 ENCOUNTER — OUTPATIENT (OUTPATIENT)
Dept: OUTPATIENT SERVICES | Facility: HOSPITAL | Age: 47
LOS: 1 days | End: 2022-05-02
Payer: MEDICAID

## 2022-05-02 ENCOUNTER — APPOINTMENT (OUTPATIENT)
Dept: ULTRASOUND IMAGING | Facility: IMAGING CENTER | Age: 47
End: 2022-05-02
Payer: MEDICAID

## 2022-05-02 DIAGNOSIS — Z90.710 ACQUIRED ABSENCE OF BOTH CERVIX AND UTERUS: Chronic | ICD-10-CM

## 2022-05-02 DIAGNOSIS — Z00.8 ENCOUNTER FOR OTHER GENERAL EXAMINATION: ICD-10-CM

## 2022-05-02 PROCEDURE — 77067 SCR MAMMO BI INCL CAD: CPT | Mod: 26

## 2022-05-02 PROCEDURE — 76641 ULTRASOUND BREAST COMPLETE: CPT | Mod: 26,50

## 2022-05-02 PROCEDURE — 77063 BREAST TOMOSYNTHESIS BI: CPT | Mod: 26

## 2022-05-02 PROCEDURE — 76641 ULTRASOUND BREAST COMPLETE: CPT

## 2022-05-02 PROCEDURE — 77063 BREAST TOMOSYNTHESIS BI: CPT

## 2022-05-02 PROCEDURE — 77067 SCR MAMMO BI INCL CAD: CPT

## 2022-05-02 RX ORDER — POLYETHYLENE GLYCOL 3350 AND ELECTROLYTES WITH LEMON FLAVOR 236; 22.74; 6.74; 5.86; 2.97 G/4L; G/4L; G/4L; G/4L; G/4L
236 POWDER, FOR SOLUTION ORAL
Qty: 1 | Refills: 0 | Status: ACTIVE | COMMUNITY
Start: 2022-05-02 | End: 1900-01-01

## 2022-06-06 ENCOUNTER — APPOINTMENT (OUTPATIENT)
Dept: FAMILY MEDICINE | Facility: CLINIC | Age: 47
End: 2022-06-06
Payer: MEDICAID

## 2022-06-06 VITALS
DIASTOLIC BLOOD PRESSURE: 82 MMHG | OXYGEN SATURATION: 98 % | HEART RATE: 83 BPM | WEIGHT: 235 LBS | SYSTOLIC BLOOD PRESSURE: 128 MMHG | HEIGHT: 66 IN | BODY MASS INDEX: 37.77 KG/M2 | TEMPERATURE: 97.3 F

## 2022-06-06 PROCEDURE — 99214 OFFICE O/P EST MOD 30 MIN: CPT

## 2022-06-06 RX ORDER — CYANOCOBALAMIN (VITAMIN B-12) 1000 MCG
1000 TABLET ORAL DAILY
Qty: 90 | Refills: 3 | Status: COMPLETED | COMMUNITY
Start: 2021-03-16 | End: 2022-06-06

## 2022-06-06 RX ORDER — ESTRADIOL 2 MG/1
2 TABLET ORAL
Qty: 60 | Refills: 0 | Status: ACTIVE | COMMUNITY
Start: 2022-05-31

## 2022-06-06 NOTE — HISTORY OF PRESENT ILLNESS
[FreeTextEntry1] : f/u [de-identified] : 47 yo F with DM, HTN, HLD presents for f/u.\par \par had covid in 2021, sense of taste and smell was altered. Meat now tastes rotten to her. Many fruits don't have any taste. She eats using texture sometimes. Needs to add a lot of salt to taste salt. Has been eating a lot of rice/pasta. Does eat farro/brown rice, other grains.

## 2022-06-07 LAB
CHOLEST SERPL-MCNC: 153 MG/DL
ESTIMATED AVERAGE GLUCOSE: 171 MG/DL
HBA1C MFR BLD HPLC: 7.6 %
HDLC SERPL-MCNC: 50 MG/DL
LDLC SERPL CALC-MCNC: 58 MG/DL
NONHDLC SERPL-MCNC: 103 MG/DL
TRIGL SERPL-MCNC: 224 MG/DL

## 2022-06-09 LAB
ALBUMIN SERPL ELPH-MCNC: 4.8 G/DL
ALP BLD-CCNC: 53 U/L
ALT SERPL-CCNC: 21 U/L
ANION GAP SERPL CALC-SCNC: 19 MMOL/L
AST SERPL-CCNC: 17 U/L
BILIRUB SERPL-MCNC: 0.2 MG/DL
BUN SERPL-MCNC: 14 MG/DL
CALCIUM SERPL-MCNC: 9.9 MG/DL
CHLORIDE SERPL-SCNC: 103 MMOL/L
CO2 SERPL-SCNC: 21 MMOL/L
CREAT SERPL-MCNC: 0.52 MG/DL
EGFR: 117 ML/MIN/1.73M2
GLUCOSE SERPL-MCNC: 140 MG/DL
POTASSIUM SERPL-SCNC: 4.6 MMOL/L
PROT SERPL-MCNC: 7.5 G/DL
SODIUM SERPL-SCNC: 143 MMOL/L

## 2022-06-13 ENCOUNTER — APPOINTMENT (OUTPATIENT)
Dept: OPHTHALMOLOGY | Facility: CLINIC | Age: 47
End: 2022-06-13
Payer: MEDICAID

## 2022-06-13 ENCOUNTER — NON-APPOINTMENT (OUTPATIENT)
Age: 47
End: 2022-06-13

## 2022-06-13 PROCEDURE — 92004 COMPRE OPH EXAM NEW PT 1/>: CPT

## 2022-06-20 ENCOUNTER — RESULT REVIEW (OUTPATIENT)
Age: 47
End: 2022-06-20

## 2022-06-20 ENCOUNTER — APPOINTMENT (OUTPATIENT)
Dept: GASTROENTEROLOGY | Facility: AMBULATORY SURGERY CENTER | Age: 47
End: 2022-06-20
Payer: MEDICAID

## 2022-06-20 PROCEDURE — 45380 COLONOSCOPY AND BIOPSY: CPT | Mod: 33

## 2022-06-20 NOTE — ASSESSMENT
[FreeTextEntry1] : 46-year-old female distant history of endometrial cancer on estrogen replacement therapy referred for initial screening colonoscopy.  There is no family history colorectal cancer.  She has baseline soft bowel movement soon after eating and was told of irritable bowel syndrome.  She has no abdominal discomfort but the possibility still exists of gallstones given the body habitus and symptoms.\par \par Plan:\par 1. She was advised to undergo colonoscopy to which she  agreed. The procedure will be performed in [facility] with the assistance of an anesthesiologist. The procedure was explained in detail and she understood the risks of the procedure not limited  to infection, bleeding, perforation, risk of anesthesia and risk of IV site problems,emergency surgery, missed lesions  or non-diagnosis of colon cancer. She  was advised that she could not drive home alone, if the patient chooses to receive sedation. Further diagnostic and treatment recommendations will be based upon the procedure and any biopsies, if they are taken.\par \par 2. Elective sonogram of abdomen to be scheduled after above\par

## 2022-06-20 NOTE — HISTORY OF PRESENT ILLNESS
[Heartburn] : denies heartburn [Nausea] : denies nausea [Vomiting] : denies vomiting [Diarrhea] : stable diarrhea [Constipation] : denies constipation [Yellow Skin Or Eyes (Jaundice)] : denies jaundice [Abdominal Pain] : denies abdominal pain [Abdominal Swelling] : denies abdominal swelling [Rectal Pain] : denies rectal pain [Irritable Bowel Syndrome] : irritable bowel syndrome [Wt Gain ___ Lbs] : no recent weight gain [Wt Loss ___ Lbs] : no recent weight loss [GERD] : no gastroesophageal reflux disease [Hiatus Hernia] : no hiatus hernia [Peptic Ulcer Disease] : no peptic ulcer disease [Pancreatitis] : no pancreatitis [Cholelithiasis] : no cholelithiasis [Kidney Stone] : no kidney stone [Inflammatory Bowel Disease] : no inflammatory bowel disease [Diverticulitis] : no diverticulitis [Alcohol Abuse] : no alcohol abuse [Malignancy] : no malignancy [Abdominal Surgery] : no abdominal surgery [Appendectomy] : no appendectomy [Cholecystectomy] : no cholecystectomy [de-identified] : 47 yo female hx of Diabetes, told of IBS by previous GI in her 20s, referred for colon cancer screening. Has loose bms within 45 min of ingestion of food. NO n/v. No family hx of GI malignancy. Doesn't recall abdominal sonogram.She has a hx of grade 1a, stage 1a endometrial ca, s/pt COURT BSO 2012.

## 2022-06-22 ENCOUNTER — TRANSCRIPTION ENCOUNTER (OUTPATIENT)
Age: 47
End: 2022-06-22

## 2022-08-15 ENCOUNTER — RX RENEWAL (OUTPATIENT)
Age: 47
End: 2022-08-15

## 2022-08-29 ENCOUNTER — NON-APPOINTMENT (OUTPATIENT)
Age: 47
End: 2022-08-29

## 2022-09-07 ENCOUNTER — APPOINTMENT (OUTPATIENT)
Dept: ORTHOPEDIC SURGERY | Facility: CLINIC | Age: 47
End: 2022-09-07

## 2022-09-07 ENCOUNTER — NON-APPOINTMENT (OUTPATIENT)
Age: 47
End: 2022-09-07

## 2022-09-07 VITALS
BODY MASS INDEX: 38.02 KG/M2 | HEIGHT: 65.5 IN | DIASTOLIC BLOOD PRESSURE: 81 MMHG | SYSTOLIC BLOOD PRESSURE: 128 MMHG | WEIGHT: 231 LBS | HEART RATE: 101 BPM

## 2022-09-07 PROCEDURE — 99214 OFFICE O/P EST MOD 30 MIN: CPT | Mod: 25

## 2022-09-07 PROCEDURE — 96372 THER/PROPH/DIAG INJ SC/IM: CPT

## 2022-09-07 PROCEDURE — 72110 X-RAY EXAM L-2 SPINE 4/>VWS: CPT

## 2022-09-07 PROCEDURE — 72170 X-RAY EXAM OF PELVIS: CPT | Mod: 59

## 2022-09-07 RX ORDER — KETOROLAC TROMETHAMINE 30 MG/ML
30 INJECTION, SOLUTION INTRAMUSCULAR; INTRAVENOUS
Qty: 1 | Refills: 0 | Status: COMPLETED | OUTPATIENT
Start: 2022-09-07

## 2022-09-07 RX ADMIN — KETOROLAC TROMETHAMINE 1 MG/ML: 30 INJECTION, SOLUTION INTRAMUSCULAR; INTRAVENOUS at 00:00

## 2022-09-07 NOTE — PHYSICAL EXAM
[Obese] : obese [Full] : is full [] : Motor: [NL] : Motor strength of the right lower extremity was normal [___/5] : left ([unfilled]/5) [Motor Strength Lower Extremities] : left (5/5) [LE] : Sensory: Intact in bilateral lower extremities [Knee] : patellar 2+ and symmetric bilaterally [Ankle] : ankle 2+ and symmetric bilaterally [DP] : dorsalis pedis 2+ and symmetric bilaterally [PT] : posterior tibial 2+ and symmetric bilaterally [de-identified] : 4 views lumbar spine obtained today demonstrate minimal left-sided lumbar curve with left trunk shift noted.  On the lateral projection of the lumbar lordosis with grade 1 spinal listhesis at L3-4 with Schmorl's node superior endplate of L4.  Significant degeneration L5-S1 with loss of disc height endplate sclerosis and suggestion of possible vacuum disc phenomenon anterior osteophyte.  Thoracolumbar degenerative changes noted.  Between flexion-extension no dynamic instability.  No obvious acute fractures.\par \par AP pelvis demonstrates normal appearance hips bilaterally.  No acute fractures.  No significant degeneration\par \par _____\par \par EXAM: MR PELVIS BONY ONLY\par \par PROCEDURE DATE: 11/22/2021\par \par INTERPRETATION: EXAMINATION: MRI of the bony pelvis without contrast\par \par CLINICAL INFORMATION: Left hip and groin pain\par \par TECHNIQUE: Multiplanar, multisequential MR imaging was performed.\par \par FINDINGS: There is no fracture or osteonecrosis. There is a physiologic amount of bilateral hip joint fluid. There is degenerative tearing of the left anterior-anterosuperior acetabular labrum with a 1.3 x 1.0 x 1.2 cm paralabral cyst. There is moderate grade chondral wear at the anterosuperior aspect of the left acetabulum with underlying subchondral cystic change. Findings are consistent with left hip osteoarthritis. There are no advanced arthritic changes at the right hip. The pubic symphysis is intact. There are minimal degenerative changes at the sacroiliac joint on the left with minimal marginating subchondral edema. There is partially imaged lower lumbar spondylosis.\par \par There are no acute high-grade or full-thickness tendon or muscle tears. There is peritendinous edema at the gluteus minimus and medius tendon insertions bilaterally and at the hamstring tendon origins bilaterally. There is mild associated reactive marrow edema signal in the right ischial tuberosity. There is no muscle atrophy. There is some nonspecific edema in the buttock subcutaneous tissues.\par \par IMPRESSION: Mild left hip osteoarthritis, with chondral wear and subchondral cystic change at the anterior aspect of the acetabulum, and with degenerative tearing of the anterior acetabular labrum with paralabral cyst.\par \par Peritendinous edema at the gluteus minimus and medius tendon insertions bilaterally and at the hamstring tendon origins bilaterally. No acute high-grade or full-thickness tendon or muscle tears.\par \par Partially imaged lower lumbar spondylosis.\par \par --- End of Report ---\par \par GORDO CERON MD; Attending Radiologist\par This document has been electronically signed. Dec 1 2021 11:03AM\par \par ------------\par \par \par EXAM: MR SPINE LUMBAR\par \par PROCEDURE DATE: 09/15/2021\par \par INTERPRETATION: EXAMINATION: MRI lumbar spine without contrast\par \par CLINICAL INFORMATION: Lumbar spondylolisthesis. Lower back pain for years.\par \par TECHNIQUE: Multiplanar, multisequential MR imaging was performed.\par \par FINDINGS: Conus terminates at the L1-2 level and is normal in signal. Vertebral body heights are maintained. There is mild anterolisthesis at L3-L4. Alignment is otherwise normal.\par \par There is multilevel disc degeneration. There is moderate disc height loss from T10-T11 through T12-L1 and at L5-S1. There are mild Modic type I endplate changes at L5-S1. There are multilevel Schmorl's nodes\par \par T10-T11, T11-T12: Only imaged on the sagittal series. Minimal disc bulges with osseous ridging. No cord impingement.\par \par T12-L1: Small left paracentral disc extrusion with 3 mm of cranial extension of disc material. No cord impingement. This is superimposed on a minimal disc bulge with osseous ridging. No spinal canal stenosis or foraminal narrowing.\par \par L1-L2: No bulging or herniated intervertebral disc. No spinal canal stenosis or foraminal narrowing.\par \par L2-L3: Minimal disc bulge. No spinal canal stenosis or foraminal narrowing.\par \par L3-L4: Grade 1 anterolisthesis with advanced bilateral facet arthrosis There is uncovering of the posterior aspect of the intervertebral disc and there is a superimposed small right paracentral disc extrusion with 5 mm of cranial extension of disc material. Disc material abuts the exiting right L3 nerve root. Mild to moderate spinal canal stenosis and moderate right and mild left foraminal narrowing.\par \par L4-L5: Minimal disc bulge. Moderate bilateral facet arthrosis. No spinal canal stenosis or foraminal narrowing.\par \par L5-S1: Disc bulge with osseous ridging which is greater on the left. Moderate bilateral foraminal narrowing. No spinal canal stenosis. Moderate bilateral foraminal narrowing at L5-S1.\par \par There is no paraspinal muscle atrophy or edema.\par \par IMPRESSION: Minimal spondylosis, as above. This is most pronounced at the L3-4 level where there is grade 1 anterolisthesis with advanced bilateral facet arthrosis and a small right paracentral disc extrusion with cranial extension. Mild to moderate L3-4 spinal canal stenosis and moderate right foraminal narrowing.\par \par --- End of Report ---\par \par GORDO CERON MD; Attending Radiologist\par This document has been electronically signed. Sep 21 2021 4:23PM [Poor Appearance] : well-appearing [Acute Distress] : not in acute distress [Abl Mood] : in a normal mood [Abl Affect] : with normal affect [Poor Coordination] : normal coordination [Disorientation] : oriented x 3 [Painful] : not painful [SLR] : negative straight leg raise [FreeTextEntry2] : The pt is awake, alert and oriented to self, place and time, is comfortable and in no acute distress. Gait examination reveals a narrow based, non-ataxic, non-antalgic gait. Can heel and toe walk without difficulty. Inspection of neck, back and lower extremities bilaterally reveals no rashes or ecchymotic lesions.  There is no obvious abnormal spinal curvature in the sagittal and coronal planes. There is no tenderness over the cervical, thoracic spine, or the paraspinal or upper and lower extremities musculature Minimal lumbosacral junction tenderness noted. There is no sacroiliac tenderness. . No atrophy or abnormal movements noted in the upper or lower extremities. There is no swelling noted in the upper or lower extremities bilaterally. No cervical lymphadenopathy noted anteriorly. No joint laxity noted in the upper and lower extremity joints bilaterally.\par  [de-identified] : ;umbar flexion to her ankles and extension 30 degrees [de-identified] : Left groin pain with internal rotation.  Minimal greater trochanteric tenderness.

## 2022-09-07 NOTE — HISTORY OF PRESENT ILLNESS
[Worsening] : worsening [9] : a current pain level of 9/10 [Daily] : ~He/She~ states the symptoms seem to be occuring daily [Prolonged Standing] : worsened by prolonged standing [Standing] : worsened by standing [de-identified] : Patient is here today for re evaluation on her chronic low back into left leg/hip pain going on for over 5-6 years. Patient saw  in 2016 had 2 lumbar epidural injections that time which did help the patient. Did PT and home exercises, pilates, etc. Patient then saw  last year due to increase in low back pain gave patient anti inflammatories sent for mri lumbar spine and bony pelvis 2011 referred to pain management which she went but no real treatment.\par COVID in 2021, tired, coughing, fevers 16 days, cough for 3 months, no treatments for it. She feels this aggravated it due to prolonged bedrest.\par Pain with standing left biuttock, lateral thigh, anterior thigh to knee. Cannot lie on left side. [de-identified] : lying down in bed [de-identified] : movement

## 2022-09-07 NOTE — DISCUSSION/SUMMARY
[Medication Risks Reviewed] : Medication risks reviewed [4 Weeks] : in 4 weeks [de-identified] : MRI of the lumbar spine and pelvis performed over the last year was independently reviewed by me and findings discussed with the patient.  She left degenerative changes at L5-S1.  She also has a disc protrusion with anterolisthesis of L3-4 which is worse on the right side with a paracentral disc extrusion with cranial extension.  However symptoms are on the left side and reproducible with hip range of motion suggesting a hip pathology rather than any spinal pathology on evaluation today.\par \par MRI of the pelvis does reveal mild left hip osteoarthritis as well as peritendinous edema of the gluteus minimus and medius tendon insertions.  Degenerative tearing of the left acetabular labrum with a 1.3 x 1 x 1.2 cm paralabral cyst is noted with moderate grade chondral wear at the anterior superior aspect left acetabulum.\par In this setting recommended she be evaluated by the sports hip orthopedist in this office for further evaluation and management\par \par I will continue to see her back on an as-needed basis for her symptoms pending evaluation.  Trial of Celebrex was prescribed for her.  For her pain today also offered her an injection of Toradol and she wanted proceed.  Under sterile conditions 30 mg of Toradol was administered intermuscularly by the LPN without incident.

## 2022-09-12 ENCOUNTER — APPOINTMENT (OUTPATIENT)
Dept: FAMILY MEDICINE | Facility: CLINIC | Age: 47
End: 2022-09-12

## 2022-09-12 VITALS
BODY MASS INDEX: 38.35 KG/M2 | TEMPERATURE: 96.5 F | OXYGEN SATURATION: 99 % | WEIGHT: 233 LBS | HEART RATE: 84 BPM | DIASTOLIC BLOOD PRESSURE: 84 MMHG | HEIGHT: 65.5 IN | SYSTOLIC BLOOD PRESSURE: 130 MMHG

## 2022-09-12 DIAGNOSIS — E78.1 PURE HYPERGLYCERIDEMIA: ICD-10-CM

## 2022-09-12 PROCEDURE — 99214 OFFICE O/P EST MOD 30 MIN: CPT

## 2022-09-12 NOTE — HISTORY OF PRESENT ILLNESS
[FreeTextEntry1] : f/u [de-identified] : 45 yo F with DM, HLD, HTN presents for f/u. She also recently dx labral tear of left hip. When she visits her brothers house in [place], has a lot of stairs, usually ends up in a lot of pain. Todarol injection helped greatly. Celebrex requires [place], ortho working on it. Would like to do PT. \par \par Finally feels change in appetite from Trulicity. Had faster response when she was on short term Rybelsus. If lack of response to Trulicity, may switch to Ozempic.

## 2022-09-14 ENCOUNTER — NON-APPOINTMENT (OUTPATIENT)
Age: 47
End: 2022-09-14

## 2022-09-14 LAB
ALBUMIN SERPL ELPH-MCNC: 4.8 G/DL
ALP BLD-CCNC: 48 U/L
ALT SERPL-CCNC: 19 U/L
ANION GAP SERPL CALC-SCNC: 12 MMOL/L
AST SERPL-CCNC: 12 U/L
BILIRUB SERPL-MCNC: 0.2 MG/DL
BUN SERPL-MCNC: 14 MG/DL
CALCIUM SERPL-MCNC: 9.8 MG/DL
CHLORIDE SERPL-SCNC: 104 MMOL/L
CHOLEST SERPL-MCNC: 151 MG/DL
CO2 SERPL-SCNC: 26 MMOL/L
CREAT SERPL-MCNC: 0.51 MG/DL
EGFR: 117 ML/MIN/1.73M2
ESTIMATED AVERAGE GLUCOSE: 157 MG/DL
GLUCOSE SERPL-MCNC: 147 MG/DL
HBA1C MFR BLD HPLC: 7.1 %
HDLC SERPL-MCNC: 50 MG/DL
LDLC SERPL CALC-MCNC: 58 MG/DL
NONHDLC SERPL-MCNC: 100 MG/DL
POTASSIUM SERPL-SCNC: 4.7 MMOL/L
PROT SERPL-MCNC: 7.3 G/DL
SODIUM SERPL-SCNC: 142 MMOL/L
TRIGL SERPL-MCNC: 210 MG/DL

## 2022-09-16 ENCOUNTER — APPOINTMENT (OUTPATIENT)
Dept: ORTHOPEDIC SURGERY | Facility: CLINIC | Age: 47
End: 2022-09-16

## 2022-09-16 VITALS — SYSTOLIC BLOOD PRESSURE: 126 MMHG | DIASTOLIC BLOOD PRESSURE: 86 MMHG | HEART RATE: 84 BPM

## 2022-09-16 DIAGNOSIS — M25.552 PAIN IN LEFT HIP: ICD-10-CM

## 2022-09-16 DIAGNOSIS — S73.192A OTHER SPRAIN OF LEFT HIP, INITIAL ENCOUNTER: ICD-10-CM

## 2022-09-16 PROCEDURE — 73502 X-RAY EXAM HIP UNI 2-3 VIEWS: CPT | Mod: LT

## 2022-09-16 PROCEDURE — 99213 OFFICE O/P EST LOW 20 MIN: CPT

## 2022-09-16 RX ORDER — CELECOXIB 100 MG/1
100 CAPSULE ORAL TWICE DAILY
Qty: 30 | Refills: 0 | Status: ACTIVE | COMMUNITY
Start: 2022-09-07

## 2022-09-16 NOTE — PHYSICAL EXAM
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  There is loss of cartilage in the acetabulum as well.

## 2022-09-16 NOTE — DISCUSSION/SUMMARY
[6 Weeks] : in 6 weeks [de-identified] : Discussed with patient that her history, exam, imaging findings do indicate that she has a tear of the labrum of the left acetabulum.  Discussed that some of her symptoms do overlap with spine pathology especially considering the fact that she had similar symptoms when she herniated disc a few years ago.  We discussed that most acetabular labral tears can be treated nonoperatively.  We will refer her for an ultrasound or fluoroscopy guided left hip injection.  This will be both diagnostic and hopefully therapeutic.  If she derive significant relief from this injection, the pain is likely coming from her hip, whereas if she does not then we must reconsider her spine pathology.  She will also start physical therapy after the injection.  We discussed that physical therapy will help strengthen the periarticular muscles around the hip which should help with long-term pain relief.  The patient understood and was in agreement with the treatment plan and all questions were answered.

## 2022-09-16 NOTE — HISTORY OF PRESENT ILLNESS
[de-identified] : KARLA SOTO  is a 46 year year old F who presents with left hip pain for the past few years.  She reports that she has had posterior hip/buttock pain on the left side for the past few years without any injury or inciting event.  She says that she feels that in the posterior buttock area with some radiation anteriorly and down the thigh a little bit.  She says she feels that if she stands for too long or if she sits with her hip flexed past 90 degrees for too long as well.  He says it has been steadily getting worse and does prevent her from doing various activities that she likes to do.  She describes it as a sharp pain that can range from a 4 to a 10 out of 10.  She also occasionally notices some numbness to the bilateral leg and thigh.  She did have a shot of Toradol last week which did help a good amount.  She has seen pain management and spine doctors in the past who recently had determined that the pain may be coming from her hip instead of her spine.  Of note she did have a disc herniation about 6 years ago which she says caused similar pain.  An epidural at that time did alleviate her pain.

## 2022-09-22 ENCOUNTER — APPOINTMENT (OUTPATIENT)
Dept: RADIOLOGY | Facility: CLINIC | Age: 47
End: 2022-09-22

## 2022-09-22 ENCOUNTER — RESULT REVIEW (OUTPATIENT)
Age: 47
End: 2022-09-22

## 2022-09-22 ENCOUNTER — OUTPATIENT (OUTPATIENT)
Dept: OUTPATIENT SERVICES | Facility: HOSPITAL | Age: 47
LOS: 1 days | End: 2022-09-22
Payer: MEDICAID

## 2022-09-22 DIAGNOSIS — S73.192A OTHER SPRAIN OF LEFT HIP, INITIAL ENCOUNTER: ICD-10-CM

## 2022-09-22 DIAGNOSIS — Z90.710 ACQUIRED ABSENCE OF BOTH CERVIX AND UTERUS: Chronic | ICD-10-CM

## 2022-09-22 PROCEDURE — 27093 INJECTION FOR HIP X-RAY: CPT

## 2022-09-22 PROCEDURE — 27093 INJECTION FOR HIP X-RAY: CPT | Mod: LT

## 2022-09-22 PROCEDURE — 73525 CONTRAST X-RAY OF HIP: CPT | Mod: 26,LT

## 2022-09-22 PROCEDURE — 73525 CONTRAST X-RAY OF HIP: CPT

## 2022-11-02 DIAGNOSIS — M54.9 DORSALGIA, UNSPECIFIED: ICD-10-CM

## 2022-11-07 ENCOUNTER — APPOINTMENT (OUTPATIENT)
Dept: ORTHOPEDIC SURGERY | Facility: CLINIC | Age: 47
End: 2022-11-07

## 2022-11-07 VITALS
SYSTOLIC BLOOD PRESSURE: 120 MMHG | BODY MASS INDEX: 36.96 KG/M2 | DIASTOLIC BLOOD PRESSURE: 78 MMHG | WEIGHT: 230 LBS | HEIGHT: 66 IN | HEART RATE: 88 BPM

## 2022-11-07 PROCEDURE — 99214 OFFICE O/P EST MOD 30 MIN: CPT

## 2022-11-07 NOTE — DISCUSSION/SUMMARY
[de-identified] : Left hip labral tear in the setting of likely early hip arthritis.  Discussed with her that given the fact that the injection worked very well for her, I would recommend continued nonoperative management.  She should continue physical therapy in order to strengthen the hip, core, back muscles.  I discussed with her that her MRI demonstrates cystic changes of the acetabulum with paralabral cyst.  These are indicative of early arthritis/cartilage overload.  Therefore even with labral repair she may continue to have pain attributable to arthritis/cartilage loss.  I discussed with her that continuing physical therapy and weight loss would greatly help her hip.  The safety steroid injection should last at least 3 months, if she has return of pain, she could have another steroid injection at that time.  I discussed with her that I would not have her do multiple steroid injections over many years, but she could have a few in order to allow her to do physical therapy.  I wrote another physical therapy prescription for her.  She will complete her course of physical therapy and if she continues to have symptoms she may see me back in the clinic.  The patient expressed understanding of her diagnosis and treatment plan and all questions were answered.\par \par This note was generated using dragon medical dictation software.  A reasonable effort has been made for proofreading its contents, but typos may still remain.  If there are any questions or points of clarification needed please notify my office.

## 2022-11-07 NOTE — PHYSICAL EXAM
[de-identified] : General: No apparent distress\par Cardiovascular: extremities warm and well-perfused, no cyanosis\par Pulmonary: non labored respirations\par \par Musculoskeletal:\par \par \par Left hip: \par Gait: Normal\par Hip Flexion: 100\par IR at 90: 30\par ER at 90: 40\par FADIR: Negative\par REI: Negative\par Subspine impingement: Negative\par \par \par \par Right hip: \par Gait: Normal\par Hip Flexion: 100\par IR at 90: 30\par ER at 90: 40\par FADIR: Negative\par REI: Negative\par Subspine impingement: Negative\par

## 2022-11-07 NOTE — HISTORY OF PRESENT ILLNESS
[de-identified] : KARLA SOTO  is a 47 year year old F who presents for follow-up of her left hip.  She was seen in September for left hip pain.  At that point she was having posterior hip/buttock pain for few years.  She had had previous issues with a disc herniation, so it is unclear if her pain is coming from her hip or her back.  I referred her for a cortisone injection of the left hip for diagnostic and therapeutic purposes.  I also prescribed physical therapy.  He says that the injection initially did not help, but after a week it resolved nearly all of her hip pain.  She no longer has pain if she stands for long period of time or if she sits for long period of time.  She is also able to do all the activities that she would like to do and has been able to participate in physical therapy as well.\par

## 2022-12-19 ENCOUNTER — APPOINTMENT (OUTPATIENT)
Dept: FAMILY MEDICINE | Facility: CLINIC | Age: 47
End: 2022-12-19

## 2022-12-19 VITALS
HEART RATE: 84 BPM | DIASTOLIC BLOOD PRESSURE: 82 MMHG | HEIGHT: 66 IN | BODY MASS INDEX: 37.93 KG/M2 | TEMPERATURE: 97.1 F | WEIGHT: 236 LBS | OXYGEN SATURATION: 99 % | SYSTOLIC BLOOD PRESSURE: 138 MMHG

## 2022-12-19 PROCEDURE — 99214 OFFICE O/P EST MOD 30 MIN: CPT

## 2022-12-19 NOTE — HISTORY OF PRESENT ILLNESS
[FreeTextEntry1] : f/u [de-identified] : 48 yo F with DM, HLD, HTN presents for f/u.\par \par b/l myalgia worse after covid booster-- nov 12. Dull ache, b/l. advil doesn't work. walking makes it a bit more uncomfortable. Was doing PT/cortisone injection in hip and was starting to feel much better until the vaccine. There is FMH RA and lupus.\par \par On low dose estrogen for about 2 years now-- for osteopenia/hot flashes. Had hysterectomy 12 years ago due to uterine cancer. No lower extremity swelling but calf tenderness/myalgia. \par \par \par

## 2022-12-20 LAB
ALBUMIN SERPL ELPH-MCNC: 4.2 G/DL
ALP BLD-CCNC: 52 U/L
ALT SERPL-CCNC: 14 U/L
ANION GAP SERPL CALC-SCNC: 14 MMOL/L
AST SERPL-CCNC: 13 U/L
BILIRUB SERPL-MCNC: 0.2 MG/DL
BUN SERPL-MCNC: 15 MG/DL
CALCIUM SERPL-MCNC: 9.9 MG/DL
CHLORIDE SERPL-SCNC: 101 MMOL/L
CHOLEST SERPL-MCNC: 169 MG/DL
CK SERPL-CCNC: 80 U/L
CO2 SERPL-SCNC: 26 MMOL/L
CREAT SERPL-MCNC: 0.5 MG/DL
CRP SERPL-MCNC: 10 MG/L
EGFR: 117 ML/MIN/1.73M2
ERYTHROCYTE [SEDIMENTATION RATE] IN BLOOD BY WESTERGREN METHOD: 29 MM/HR
ESTIMATED AVERAGE GLUCOSE: 134 MG/DL
GLUCOSE SERPL-MCNC: 169 MG/DL
HBA1C MFR BLD HPLC: 6.3 %
HDLC SERPL-MCNC: 54 MG/DL
LDLC SERPL CALC-MCNC: 73 MG/DL
NONHDLC SERPL-MCNC: 115 MG/DL
POTASSIUM SERPL-SCNC: 3.9 MMOL/L
PROT SERPL-MCNC: 7.3 G/DL
RHEUMATOID FACT SER QL: <10 IU/ML
SODIUM SERPL-SCNC: 141 MMOL/L
TRIGL SERPL-MCNC: 212 MG/DL

## 2022-12-21 ENCOUNTER — LABORATORY RESULT (OUTPATIENT)
Age: 47
End: 2022-12-21

## 2022-12-21 DIAGNOSIS — M79.10 MYALGIA, UNSPECIFIED SITE: ICD-10-CM

## 2022-12-21 LAB
ANA PAT FLD IF-IMP: NORMAL
ANA SER IF-ACNC: ABNORMAL
CCP AB SER IA-ACNC: <8 UNITS
DSDNA AB SER-ACNC: <12 IU/ML
RF+CCP IGG SER-IMP: NEGATIVE

## 2022-12-22 LAB
B BURGDOR AB SER-IMP: NEGATIVE
B BURGDOR IGG+IGM SER QL: 0.03 INDEX

## 2022-12-23 LAB
ALDOLASE SERPL-CCNC: 4.6 U/L
ENA JO1 AB SER IA-ACNC: <0.2 AL
ENA SCL70 IGG SER IA-ACNC: <0.2 AL
ENA SS-A AB SER IA-ACNC: <0.2 AL
ENA SS-B AB SER IA-ACNC: <0.2 AL
SMOOTH MUSCLE AB SER QL IF: NORMAL

## 2022-12-31 ENCOUNTER — OUTPATIENT (OUTPATIENT)
Dept: OUTPATIENT SERVICES | Facility: HOSPITAL | Age: 47
LOS: 1 days | End: 2022-12-31
Payer: MEDICAID

## 2022-12-31 ENCOUNTER — APPOINTMENT (OUTPATIENT)
Dept: ULTRASOUND IMAGING | Facility: CLINIC | Age: 47
End: 2022-12-31
Payer: MEDICAID

## 2022-12-31 DIAGNOSIS — M79.10 MYALGIA, UNSPECIFIED SITE: ICD-10-CM

## 2022-12-31 DIAGNOSIS — Z90.710 ACQUIRED ABSENCE OF BOTH CERVIX AND UTERUS: Chronic | ICD-10-CM

## 2022-12-31 PROCEDURE — 93970 EXTREMITY STUDY: CPT | Mod: 26

## 2022-12-31 PROCEDURE — 93970 EXTREMITY STUDY: CPT

## 2023-01-03 ENCOUNTER — TRANSCRIPTION ENCOUNTER (OUTPATIENT)
Age: 48
End: 2023-01-03

## 2023-02-02 ENCOUNTER — RX RENEWAL (OUTPATIENT)
Age: 48
End: 2023-02-02

## 2023-03-13 ENCOUNTER — APPOINTMENT (OUTPATIENT)
Dept: INTERNAL MEDICINE | Facility: CLINIC | Age: 48
End: 2023-03-13
Payer: MEDICAID

## 2023-04-24 ENCOUNTER — NON-APPOINTMENT (OUTPATIENT)
Age: 48
End: 2023-04-24

## 2023-04-24 ENCOUNTER — APPOINTMENT (OUTPATIENT)
Dept: INTERNAL MEDICINE | Facility: CLINIC | Age: 48
End: 2023-04-24
Payer: MEDICAID

## 2023-04-24 VITALS
SYSTOLIC BLOOD PRESSURE: 134 MMHG | WEIGHT: 255 LBS | BODY MASS INDEX: 40.98 KG/M2 | TEMPERATURE: 98.1 F | HEIGHT: 66 IN | DIASTOLIC BLOOD PRESSURE: 80 MMHG | OXYGEN SATURATION: 98 % | HEART RATE: 74 BPM

## 2023-04-24 PROCEDURE — 93000 ELECTROCARDIOGRAM COMPLETE: CPT

## 2023-04-24 PROCEDURE — 99396 PREV VISIT EST AGE 40-64: CPT | Mod: 25

## 2023-04-24 NOTE — HEALTH RISK ASSESSMENT
[PHQ-2 Negative - No further assessment needed] : PHQ-2 Negative - No further assessment needed [PHQ-9 Negative - No further assessment needed] : PHQ-9 Negative - No further assessment needed [[id number]] : 0 [Patient reported PAP Smear was normal] : Patient reported PAP Smear was normal [Patient reported colonoscopy was normal] : Patient reported colonoscopy was normal [MammogramComments] : has appt next week [PapSmearDate] : 04/23 [ColonoscopyDate] : 06/22

## 2023-04-24 NOTE — HISTORY OF PRESENT ILLNESS
[FreeTextEntry1] : annual [de-identified] : 46 yo F with DM, HLD, HTN presents for annual. No concerns at this time. Pt is massage therapist, has own office. + stressors\par \par Not much exercise, diet ok.

## 2023-04-26 ENCOUNTER — TRANSCRIPTION ENCOUNTER (OUTPATIENT)
Age: 48
End: 2023-04-26

## 2023-04-26 LAB
25(OH)D3 SERPL-MCNC: 55.8 NG/ML
ALBUMIN SERPL ELPH-MCNC: 4.2 G/DL
ALP BLD-CCNC: 49 U/L
ALT SERPL-CCNC: 19 U/L
ANION GAP SERPL CALC-SCNC: 15 MMOL/L
AST SERPL-CCNC: 14 U/L
BASOPHILS # BLD AUTO: 0.07 K/UL
BASOPHILS NFR BLD AUTO: 0.9 %
BILIRUB SERPL-MCNC: 0.2 MG/DL
BUN SERPL-MCNC: 14 MG/DL
CALCIUM SERPL-MCNC: 9.9 MG/DL
CHLORIDE SERPL-SCNC: 102 MMOL/L
CHOLEST SERPL-MCNC: 139 MG/DL
CO2 SERPL-SCNC: 23 MMOL/L
CREAT SERPL-MCNC: 0.53 MG/DL
CREAT SPEC-SCNC: 61 MG/DL
EGFR: 115 ML/MIN/1.73M2
EOSINOPHIL # BLD AUTO: 0.15 K/UL
EOSINOPHIL NFR BLD AUTO: 2 %
ESTIMATED AVERAGE GLUCOSE: 163 MG/DL
GLUCOSE SERPL-MCNC: 133 MG/DL
HBA1C MFR BLD HPLC: 7.3 %
HCT VFR BLD CALC: 42.6 %
HDLC SERPL-MCNC: 51 MG/DL
HGB BLD-MCNC: 13.1 G/DL
IMM GRANULOCYTES NFR BLD AUTO: 0.3 %
LDLC SERPL CALC-MCNC: 58 MG/DL
LYMPHOCYTES # BLD AUTO: 2.26 K/UL
LYMPHOCYTES NFR BLD AUTO: 29.4 %
MAN DIFF?: NORMAL
MCHC RBC-ENTMCNC: 26.3 PG
MCHC RBC-ENTMCNC: 30.8 GM/DL
MCV RBC AUTO: 85.4 FL
MICROALBUMIN 24H UR DL<=1MG/L-MCNC: <1.2 MG/DL
MICROALBUMIN/CREAT 24H UR-RTO: NORMAL MG/G
MONOCYTES # BLD AUTO: 0.59 K/UL
MONOCYTES NFR BLD AUTO: 7.7 %
NEUTROPHILS # BLD AUTO: 4.6 K/UL
NEUTROPHILS NFR BLD AUTO: 59.7 %
NONHDLC SERPL-MCNC: 88 MG/DL
PLATELET # BLD AUTO: 391 K/UL
POTASSIUM SERPL-SCNC: 4.7 MMOL/L
PROT SERPL-MCNC: 7.5 G/DL
RBC # BLD: 4.99 M/UL
RBC # FLD: 14.1 %
SODIUM SERPL-SCNC: 140 MMOL/L
TRIGL SERPL-MCNC: 150 MG/DL
TSH SERPL-ACNC: 0.93 UIU/ML
VIT B12 SERPL-MCNC: 361 PG/ML
WBC # FLD AUTO: 7.69 K/UL

## 2023-06-12 ENCOUNTER — OUTPATIENT (OUTPATIENT)
Dept: OUTPATIENT SERVICES | Facility: HOSPITAL | Age: 48
LOS: 1 days | End: 2023-06-12
Payer: MEDICAID

## 2023-06-12 ENCOUNTER — APPOINTMENT (OUTPATIENT)
Dept: RADIOLOGY | Facility: CLINIC | Age: 48
End: 2023-06-12
Payer: MEDICAID

## 2023-06-12 ENCOUNTER — APPOINTMENT (OUTPATIENT)
Dept: ULTRASOUND IMAGING | Facility: CLINIC | Age: 48
End: 2023-06-12
Payer: MEDICAID

## 2023-06-12 ENCOUNTER — APPOINTMENT (OUTPATIENT)
Dept: MAMMOGRAPHY | Facility: CLINIC | Age: 48
End: 2023-06-12
Payer: MEDICAID

## 2023-06-12 DIAGNOSIS — Z90.710 ACQUIRED ABSENCE OF BOTH CERVIX AND UTERUS: Chronic | ICD-10-CM

## 2023-06-12 DIAGNOSIS — Z00.8 ENCOUNTER FOR OTHER GENERAL EXAMINATION: ICD-10-CM

## 2023-06-12 PROCEDURE — 76641 ULTRASOUND BREAST COMPLETE: CPT | Mod: 26,50

## 2023-06-12 PROCEDURE — 77067 SCR MAMMO BI INCL CAD: CPT | Mod: 26

## 2023-06-12 PROCEDURE — 77063 BREAST TOMOSYNTHESIS BI: CPT | Mod: 26

## 2023-06-12 PROCEDURE — 77080 DXA BONE DENSITY AXIAL: CPT | Mod: 26

## 2023-06-12 PROCEDURE — 77080 DXA BONE DENSITY AXIAL: CPT

## 2023-06-12 PROCEDURE — 76641 ULTRASOUND BREAST COMPLETE: CPT

## 2023-06-12 PROCEDURE — 77067 SCR MAMMO BI INCL CAD: CPT

## 2023-06-12 PROCEDURE — 77063 BREAST TOMOSYNTHESIS BI: CPT

## 2023-07-17 ENCOUNTER — APPOINTMENT (OUTPATIENT)
Dept: OPHTHALMOLOGY | Facility: CLINIC | Age: 48
End: 2023-07-17

## 2023-08-17 ENCOUNTER — APPOINTMENT (OUTPATIENT)
Dept: OPHTHALMOLOGY | Facility: CLINIC | Age: 48
End: 2023-08-17
Payer: MEDICAID

## 2023-08-17 ENCOUNTER — NON-APPOINTMENT (OUTPATIENT)
Age: 48
End: 2023-08-17

## 2023-08-17 PROCEDURE — 92014 COMPRE OPH EXAM EST PT 1/>: CPT

## 2023-09-25 ENCOUNTER — APPOINTMENT (OUTPATIENT)
Dept: INTERNAL MEDICINE | Facility: CLINIC | Age: 48
End: 2023-09-25
Payer: MEDICAID

## 2023-09-25 VITALS
DIASTOLIC BLOOD PRESSURE: 87 MMHG | BODY MASS INDEX: 39.34 KG/M2 | HEIGHT: 65.5 IN | HEART RATE: 87 BPM | SYSTOLIC BLOOD PRESSURE: 144 MMHG | WEIGHT: 239 LBS | OXYGEN SATURATION: 97 % | RESPIRATION RATE: 18 BRPM

## 2023-09-25 VITALS — DIASTOLIC BLOOD PRESSURE: 88 MMHG | SYSTOLIC BLOOD PRESSURE: 130 MMHG

## 2023-09-25 DIAGNOSIS — E53.8 DEFICIENCY OF OTHER SPECIFIED B GROUP VITAMINS: ICD-10-CM

## 2023-09-25 PROCEDURE — 99214 OFFICE O/P EST MOD 30 MIN: CPT

## 2023-09-25 RX ORDER — DULAGLUTIDE 3 MG/.5ML
3 INJECTION, SOLUTION SUBCUTANEOUS
Qty: 3 | Refills: 3 | Status: ACTIVE | COMMUNITY
Start: 2022-03-07 | End: 1900-01-01

## 2023-09-26 ENCOUNTER — TRANSCRIPTION ENCOUNTER (OUTPATIENT)
Age: 48
End: 2023-09-26

## 2023-09-26 LAB
ALBUMIN SERPL ELPH-MCNC: 4.4 G/DL
ALP BLD-CCNC: 47 U/L
ALT SERPL-CCNC: 16 U/L
ANION GAP SERPL CALC-SCNC: 13 MMOL/L
AST SERPL-CCNC: 16 U/L
BASOPHILS # BLD AUTO: 0.07 K/UL
BASOPHILS NFR BLD AUTO: 0.9 %
BILIRUB SERPL-MCNC: 0.2 MG/DL
BUN SERPL-MCNC: 15 MG/DL
CALCIUM SERPL-MCNC: 9.8 MG/DL
CHLORIDE SERPL-SCNC: 104 MMOL/L
CHOLEST SERPL-MCNC: 155 MG/DL
CO2 SERPL-SCNC: 24 MMOL/L
CREAT SERPL-MCNC: 0.55 MG/DL
EGFR: 114 ML/MIN/1.73M2
EOSINOPHIL # BLD AUTO: 0.21 K/UL
EOSINOPHIL NFR BLD AUTO: 2.6 %
ESTIMATED AVERAGE GLUCOSE: 151 MG/DL
GLUCOSE SERPL-MCNC: 134 MG/DL
HBA1C MFR BLD HPLC: 6.9 %
HCT VFR BLD CALC: 42.9 %
HDLC SERPL-MCNC: 54 MG/DL
HGB BLD-MCNC: 13.1 G/DL
IMM GRANULOCYTES NFR BLD AUTO: 0.4 %
LDLC SERPL CALC-MCNC: 67 MG/DL
LYMPHOCYTES # BLD AUTO: 2.33 K/UL
LYMPHOCYTES NFR BLD AUTO: 28.9 %
MAN DIFF?: NORMAL
MCHC RBC-ENTMCNC: 26.4 PG
MCHC RBC-ENTMCNC: 30.5 GM/DL
MCV RBC AUTO: 86.5 FL
MONOCYTES # BLD AUTO: 0.49 K/UL
MONOCYTES NFR BLD AUTO: 6.1 %
NEUTROPHILS # BLD AUTO: 4.93 K/UL
NEUTROPHILS NFR BLD AUTO: 61.1 %
NONHDLC SERPL-MCNC: 101 MG/DL
PLATELET # BLD AUTO: 376 K/UL
POTASSIUM SERPL-SCNC: 4.9 MMOL/L
PROT SERPL-MCNC: 7.2 G/DL
RBC # BLD: 4.96 M/UL
RBC # FLD: 14.2 %
SODIUM SERPL-SCNC: 141 MMOL/L
TRIGL SERPL-MCNC: 206 MG/DL
VIT B12 SERPL-MCNC: 304 PG/ML
WBC # FLD AUTO: 8.06 K/UL

## 2023-10-21 ENCOUNTER — NON-APPOINTMENT (OUTPATIENT)
Age: 48
End: 2023-10-21

## 2024-01-08 ENCOUNTER — APPOINTMENT (OUTPATIENT)
Dept: INTERNAL MEDICINE | Facility: CLINIC | Age: 49
End: 2024-01-08
Payer: MEDICAID

## 2024-01-08 VITALS
WEIGHT: 231 LBS | SYSTOLIC BLOOD PRESSURE: 151 MMHG | DIASTOLIC BLOOD PRESSURE: 89 MMHG | TEMPERATURE: 98.1 F | HEART RATE: 98 BPM | OXYGEN SATURATION: 97 % | HEIGHT: 65.5 IN | BODY MASS INDEX: 38.02 KG/M2

## 2024-01-08 VITALS — DIASTOLIC BLOOD PRESSURE: 90 MMHG | SYSTOLIC BLOOD PRESSURE: 134 MMHG

## 2024-01-08 DIAGNOSIS — E78.5 HYPERLIPIDEMIA, UNSPECIFIED: ICD-10-CM

## 2024-01-08 DIAGNOSIS — R49.0 DYSPHONIA: ICD-10-CM

## 2024-01-08 LAB
ALBUMIN SERPL ELPH-MCNC: 4.4 G/DL
ALP BLD-CCNC: 52 U/L
ALT SERPL-CCNC: 13 U/L
ANION GAP SERPL CALC-SCNC: 14 MMOL/L
AST SERPL-CCNC: 11 U/L
BILIRUB SERPL-MCNC: 0.2 MG/DL
BUN SERPL-MCNC: 12 MG/DL
CALCIUM SERPL-MCNC: 9.7 MG/DL
CHLORIDE SERPL-SCNC: 100 MMOL/L
CHOLEST SERPL-MCNC: 155 MG/DL
CO2 SERPL-SCNC: 25 MMOL/L
CREAT SERPL-MCNC: 0.52 MG/DL
EGFR: 115 ML/MIN/1.73M2
ESTIMATED AVERAGE GLUCOSE: 143 MG/DL
GLUCOSE SERPL-MCNC: 135 MG/DL
HBA1C MFR BLD HPLC: 6.6 %
HDLC SERPL-MCNC: 48 MG/DL
LDLC SERPL CALC-MCNC: 69 MG/DL
NONHDLC SERPL-MCNC: 107 MG/DL
POTASSIUM SERPL-SCNC: 4.8 MMOL/L
PROT SERPL-MCNC: 7.4 G/DL
SODIUM SERPL-SCNC: 140 MMOL/L
TRIGL SERPL-MCNC: 229 MG/DL

## 2024-01-08 PROCEDURE — 90686 IIV4 VACC NO PRSV 0.5 ML IM: CPT

## 2024-01-08 PROCEDURE — 99214 OFFICE O/P EST MOD 30 MIN: CPT | Mod: 25

## 2024-01-08 PROCEDURE — G0008: CPT

## 2024-01-08 PROCEDURE — G2211 COMPLEX E/M VISIT ADD ON: CPT

## 2024-01-08 RX ORDER — ACETAMINOPHEN/DIPHENHYDRAMINE 500MG-25MG
1000 TABLET ORAL
Qty: 90 | Refills: 3 | Status: ACTIVE | COMMUNITY
Start: 2022-02-15 | End: 1900-01-01

## 2024-01-08 NOTE — HISTORY OF PRESENT ILLNESS
[de-identified] : 47 yo F with DM, HTN, HLD presents for f/u.  prolonged colds x 2 Oct. Has ENT appt later today for persistent hoarseness. Needs referral. diet has been ok, appetite down. Also trulicity 3mg. Eats 2 meals/day due to work.  exercise-- walks dog   Just took BP meds hr ago.

## 2024-02-07 ENCOUNTER — NON-APPOINTMENT (OUTPATIENT)
Age: 49
End: 2024-02-07

## 2024-02-13 NOTE — PATIENT PROFILE ADULT. - HAS THE PATIENT HAD A SIGNIFICANT CHANGE IN FUNCTIONAL STATUS DUE TO CVA, HEAD TRAUMA, ORTHOPEDIC TRAUMA/SURGERY, OR FALL, WITH THE WEEK PRIOR TO ADMISSION
Caller: Natalya GEORGE    Relationship: Self    Best call back number: [phone]    What was the call regarding: PT STATED HER LMP: 01/05. SHE IS CURRENTLY 5 DAYS LATE AND ALL THE AT HOME TESTS SHE HAS TAKEN HAVE COME BACK NEGATIVE. PT REQUESTING LAB WORK.     PT WOULD LIKE A CALL BACK, OKAY TO LEAVE A VM   no

## 2024-04-01 ENCOUNTER — RX RENEWAL (OUTPATIENT)
Age: 49
End: 2024-04-01

## 2024-04-01 RX ORDER — ATORVASTATIN CALCIUM 10 MG/1
10 TABLET, FILM COATED ORAL
Qty: 90 | Refills: 3 | Status: ACTIVE | COMMUNITY
Start: 2020-09-29 | End: 1900-01-01

## 2024-04-01 RX ORDER — METOPROLOL SUCCINATE 50 MG/1
50 TABLET, EXTENDED RELEASE ORAL
Qty: 90 | Refills: 3 | Status: ACTIVE | COMMUNITY
Start: 2020-09-28 | End: 1900-01-01

## 2024-04-01 RX ORDER — HYDROXYZINE HYDROCHLORIDE 25 MG/1
25 TABLET ORAL
Qty: 90 | Refills: 3 | Status: ACTIVE | COMMUNITY
Start: 2020-12-10 | End: 1900-01-01

## 2024-04-04 ENCOUNTER — RX RENEWAL (OUTPATIENT)
Age: 49
End: 2024-04-04

## 2024-04-04 RX ORDER — AMLODIPINE BESYLATE 5 MG/1
5 TABLET ORAL
Qty: 90 | Refills: 3 | Status: ACTIVE | COMMUNITY
Start: 2019-11-06 | End: 1900-01-01

## 2024-04-04 RX ORDER — GLIMEPIRIDE 2 MG/1
2 TABLET ORAL
Qty: 180 | Refills: 3 | Status: ACTIVE | COMMUNITY
Start: 2020-09-29 | End: 1900-01-01

## 2024-05-03 ENCOUNTER — APPOINTMENT (OUTPATIENT)
Dept: ORTHOPEDIC SURGERY | Facility: CLINIC | Age: 49
End: 2024-05-03
Payer: MEDICAID

## 2024-05-03 DIAGNOSIS — M65.331 TRIGGER FINGER, RIGHT MIDDLE FINGER: ICD-10-CM

## 2024-05-03 PROCEDURE — 20550 NJX 1 TENDON SHEATH/LIGAMENT: CPT | Mod: F7

## 2024-05-03 PROCEDURE — 99214 OFFICE O/P EST MOD 30 MIN: CPT | Mod: 25

## 2024-05-06 ENCOUNTER — APPOINTMENT (OUTPATIENT)
Dept: INTERNAL MEDICINE | Facility: CLINIC | Age: 49
End: 2024-05-06
Payer: MEDICAID

## 2024-05-06 ENCOUNTER — NON-APPOINTMENT (OUTPATIENT)
Age: 49
End: 2024-05-06

## 2024-05-06 VITALS
DIASTOLIC BLOOD PRESSURE: 99 MMHG | BODY MASS INDEX: 38.85 KG/M2 | SYSTOLIC BLOOD PRESSURE: 158 MMHG | WEIGHT: 236 LBS | HEIGHT: 65.5 IN | HEART RATE: 83 BPM | OXYGEN SATURATION: 96 %

## 2024-05-06 VITALS — DIASTOLIC BLOOD PRESSURE: 85 MMHG | SYSTOLIC BLOOD PRESSURE: 135 MMHG

## 2024-05-06 DIAGNOSIS — E55.9 VITAMIN D DEFICIENCY, UNSPECIFIED: ICD-10-CM

## 2024-05-06 DIAGNOSIS — Z00.00 ENCOUNTER FOR GENERAL ADULT MEDICAL EXAMINATION W/OUT ABNORMAL FINDINGS: ICD-10-CM

## 2024-05-06 DIAGNOSIS — I10 ESSENTIAL (PRIMARY) HYPERTENSION: ICD-10-CM

## 2024-05-06 DIAGNOSIS — E11.9 TYPE 2 DIABETES MELLITUS W/OUT COMPLICATIONS: ICD-10-CM

## 2024-05-06 PROCEDURE — 93000 ELECTROCARDIOGRAM COMPLETE: CPT

## 2024-05-06 PROCEDURE — 99396 PREV VISIT EST AGE 40-64: CPT

## 2024-05-06 NOTE — HEALTH RISK ASSESSMENT
[Patient reported colonoscopy was normal] : Patient reported colonoscopy was normal [No Retinopathy] : No retinopathy [Never] : Never [EyeExamDate] : 08/23 [MammogramDate] : 06/23 [PapSmearDate] : 06/23 [ColonoscopyDate] : 06/22 [ColonoscopyComments] : 10 years

## 2024-05-06 NOTE — HISTORY OF PRESENT ILLNESS
[FreeTextEntry1] : annual [de-identified] : 47 yo F with DM, HTN, HLD, insomnia, allergies presents for annual.   fatigue-- taking B12, was low previously. Some stressors but not too bad. Getting 6 hrs every night. Does tend to feel more drowsy and tired if she gets 8 hrs. Hydroxyzine helping with insomnia and allergies. Has pollen allergy and possible dog dander as well. Noticed ever since having dog, she has been more congested. Has been needing to take claritin during day. If she misses hydroxyzine at night due to too late in evening, then she notices more congestion.   Diet/exercise unchanged. Has not lost much weight on Trulicity. Finds she's been getting hungrier.   Also on progesterone/estrogen replacement therapy ever since [facility] 10 years ago secondary to endometrial cancer.  For past week, has been feeling cheeks get red and warm as well as top part of chest. Does not feel like hot flash.

## 2024-05-07 ENCOUNTER — TRANSCRIPTION ENCOUNTER (OUTPATIENT)
Age: 49
End: 2024-05-07

## 2024-05-10 LAB
25(OH)D3 SERPL-MCNC: 53.1 NG/ML
ALBUMIN SERPL ELPH-MCNC: 4.8 G/DL
ALP BLD-CCNC: 54 U/L
ALT SERPL-CCNC: 9 U/L
ANION GAP SERPL CALC-SCNC: 13 MMOL/L
AST SERPL-CCNC: 7 U/L
BASOPHILS # BLD AUTO: 0.06 K/UL
BASOPHILS NFR BLD AUTO: 0.6 %
BILIRUB SERPL-MCNC: 0.2 MG/DL
BUN SERPL-MCNC: 17 MG/DL
CALCIUM SERPL-MCNC: 9.9 MG/DL
CHLORIDE SERPL-SCNC: 102 MMOL/L
CHOLEST SERPL-MCNC: 187 MG/DL
CO2 SERPL-SCNC: 24 MMOL/L
CREAT SERPL-MCNC: 0.51 MG/DL
CREAT SPEC-SCNC: 52 MG/DL
EGFR: 116 ML/MIN/1.73M2
EOSINOPHIL # BLD AUTO: 0.04 K/UL
EOSINOPHIL NFR BLD AUTO: 0.4 %
ESTIMATED AVERAGE GLUCOSE: 169 MG/DL
GLUCOSE SERPL-MCNC: 179 MG/DL
HBA1C MFR BLD HPLC: 7.5 %
HCT VFR BLD CALC: 42.7 %
HDLC SERPL-MCNC: 74 MG/DL
HGB BLD-MCNC: 13.4 G/DL
IMM GRANULOCYTES NFR BLD AUTO: 0.5 %
LDLC SERPL CALC-MCNC: 88 MG/DL
LYMPHOCYTES # BLD AUTO: 1.88 K/UL
LYMPHOCYTES NFR BLD AUTO: 19.5 %
MAN DIFF?: NORMAL
MCHC RBC-ENTMCNC: 27 PG
MCHC RBC-ENTMCNC: 31.4 GM/DL
MCV RBC AUTO: 86.1 FL
MICROALBUMIN 24H UR DL<=1MG/L-MCNC: 3.1 MG/DL
MICROALBUMIN/CREAT 24H UR-RTO: 59 MG/G
MONOCYTES # BLD AUTO: 0.53 K/UL
MONOCYTES NFR BLD AUTO: 5.5 %
NEUTROPHILS # BLD AUTO: 7.07 K/UL
NEUTROPHILS NFR BLD AUTO: 73.5 %
NONHDLC SERPL-MCNC: 113 MG/DL
PLATELET # BLD AUTO: 402 K/UL
POTASSIUM SERPL-SCNC: 4.7 MMOL/L
PROT SERPL-MCNC: 7.7 G/DL
RBC # BLD: 4.96 M/UL
RBC # FLD: 14.8 %
SODIUM SERPL-SCNC: 138 MMOL/L
TRIGL SERPL-MCNC: 151 MG/DL
TSH SERPL-ACNC: 1.98 UIU/ML
VIT B12 SERPL-MCNC: 897 PG/ML
WBC # FLD AUTO: 9.63 K/UL

## 2024-05-26 ENCOUNTER — RX ONLY (RX ONLY)
Age: 49
End: 2024-05-26

## 2024-05-26 ENCOUNTER — OFFICE (OUTPATIENT)
Dept: URBAN - METROPOLITAN AREA CLINIC 90 | Facility: CLINIC | Age: 49
Setting detail: OPHTHALMOLOGY
End: 2024-05-26
Payer: COMMERCIAL

## 2024-05-26 DIAGNOSIS — H01.005: ICD-10-CM

## 2024-05-26 DIAGNOSIS — H01.001: ICD-10-CM

## 2024-05-26 DIAGNOSIS — H01.004: ICD-10-CM

## 2024-05-26 DIAGNOSIS — H01.002: ICD-10-CM

## 2024-05-26 PROBLEM — E11.9 DIABETES TYPE 2 NO RETINOPATHY: Status: ACTIVE | Noted: 2024-05-26

## 2024-05-26 PROBLEM — H16.223 DRY EYE SYNDROME K SICCA; BOTH EYES: Status: ACTIVE | Noted: 2024-05-26

## 2024-05-26 PROBLEM — H25.13 CATARACT SENILE NUCLEAR SCLEROSIS; BOTH EYES: Status: ACTIVE | Noted: 2024-05-26

## 2024-05-26 PROCEDURE — 92004 COMPRE OPH EXAM NEW PT 1/>: CPT | Performed by: OPHTHALMOLOGY

## 2024-05-26 ASSESSMENT — LID EXAM ASSESSMENTS
OD_BLEPHARITIS: RLL RUL 2+
OS_BLEPHARITIS: LLL LUL 2+

## 2024-05-26 ASSESSMENT — CONFRONTATIONAL VISUAL FIELD TEST (CVF)
OD_FINDINGS: FULL
OS_FINDINGS: FULL

## 2024-06-03 ENCOUNTER — APPOINTMENT (OUTPATIENT)
Dept: ORTHOPEDIC SURGERY | Facility: CLINIC | Age: 49
End: 2024-06-03
Payer: MEDICAID

## 2024-06-03 VITALS
HEIGHT: 65.5 IN | BODY MASS INDEX: 38.68 KG/M2 | SYSTOLIC BLOOD PRESSURE: 140 MMHG | DIASTOLIC BLOOD PRESSURE: 88 MMHG | HEART RATE: 90 BPM | WEIGHT: 235 LBS

## 2024-06-03 DIAGNOSIS — M54.16 RADICULOPATHY, LUMBAR REGION: ICD-10-CM

## 2024-06-03 DIAGNOSIS — M43.16 SPONDYLOLISTHESIS, LUMBAR REGION: ICD-10-CM

## 2024-06-03 DIAGNOSIS — M51.36 OTHER INTERVERTEBRAL DISC DEGENERATION, LUMBAR REGION: ICD-10-CM

## 2024-06-03 DIAGNOSIS — M43.10 SPONDYLOLISTHESIS, SITE UNSPECIFIED: ICD-10-CM

## 2024-06-03 PROCEDURE — 72170 X-RAY EXAM OF PELVIS: CPT

## 2024-06-03 PROCEDURE — 99214 OFFICE O/P EST MOD 30 MIN: CPT

## 2024-06-03 PROCEDURE — 72110 X-RAY EXAM L-2 SPINE 4/>VWS: CPT

## 2024-06-03 RX ORDER — METHOCARBAMOL 500 MG/1
500 TABLET, FILM COATED ORAL 3 TIMES DAILY
Qty: 30 | Refills: 0 | Status: ACTIVE | COMMUNITY
Start: 2024-06-03 | End: 1900-01-01

## 2024-06-03 RX ORDER — DICLOFENAC SODIUM 50 MG/1
50 TABLET, DELAYED RELEASE ORAL
Qty: 30 | Refills: 2 | Status: ACTIVE | COMMUNITY
Start: 2024-06-03 | End: 1900-01-01

## 2024-06-11 NOTE — DISCUSSION/SUMMARY
[Medication Risks Reviewed] : Medication risks reviewed [de-identified] : Assessment: - Summary : Narcisa Enriquez is experiencing a flare-up of her underlying back and hip conditions, including mild scoliosis, disc degeneration at L5-S1, and mild slippage (anterolisthesis) at L4-L5 and L3-L4. These findings have not changed significantly since her previous visit in September 2022. - Problems : - Flare-up of back pain  - Degenerative disc disease  - Mild scoliosis  - Anterolisthesis at L4-L5 and L3-L4  - History of torn labrum in the left hip  - Differential Diagnosis : - The most likely diagnosis is a flare-up of her underlying degenerative spine conditions, including disc degeneration, scoliosis, and mild slippage (anterolisthesis).  - The radiating pain into the right hip and buttock area could be due to nerve irritation from the central spine problem, as the symptoms can sometimes shift from one side to the other.  Plan: - Summary : The proposed plan includes prescribing a stronger anti-inflammatory medication (Diclofenac) and a muscle relaxant for symptomatic relief, as well as referring Narcisa for physical therapy to help manage her condition. - Plan : - Prescribe Diclofenac (a stronger anti-inflammatory medication) for pain relief.  - Prescribe a muscle relaxant to be taken in the evenings as needed.  - Refer for physical therapy to help manage the condition and strengthen the core muscles.  - Advise on continuing with exercises like Pilates to maintain flexibility and strength.

## 2024-06-11 NOTE — PHYSICAL EXAM
[Obese] : obese [Full] : is full [] : Motor: [NL] : Motor strength of the right lower extremity was normal [___/5] : left ([unfilled]/5) [Motor Strength Lower Extremities] : left (5/5) [LE] : Sensory: Intact in bilateral lower extremities [Knee] : patellar 2+ and symmetric bilaterally [Ankle] : ankle 2+ and symmetric bilaterally [DP] : dorsalis pedis 2+ and symmetric bilaterally [PT] : posterior tibial 2+ and symmetric bilaterally [de-identified] : 4 views lumbar spine obtained today demonstrate minimal left-sided lumbar curve with left trunk shift noted.  On the lateral projection of the lumbar lordosis with grade 1 spinal listhesis at L3-4 with Schmorl's node superior endplate of L4.  Significant degeneration L5-S1 with loss of disc height endplate sclerosis and suggestion of possible vacuum disc phenomenon anterior osteophyte.  Thoracolumbar degenerative changes noted.  Between flexion-extension no dynamic instability.  No obvious acute fractures.  AP pelvis demonstrates normal appearance hips bilaterally.  No acute fractures.  No significant degeneration  No significant interval change from 9/7/22 _____  EXAM: MR PELVIS BONY ONLY  PROCEDURE DATE: 11/22/2021  INTERPRETATION: EXAMINATION: MRI of the bony pelvis without contrast  CLINICAL INFORMATION: Left hip and groin pain  TECHNIQUE: Multiplanar, multisequential MR imaging was performed.  FINDINGS: There is no fracture or osteonecrosis. There is a physiologic amount of bilateral hip joint fluid. There is degenerative tearing of the left anterior-anterosuperior acetabular labrum with a 1.3 x 1.0 x 1.2 cm paralabral cyst. There is moderate grade chondral wear at the anterosuperior aspect of the left acetabulum with underlying subchondral cystic change. Findings are consistent with left hip osteoarthritis. There are no advanced arthritic changes at the right hip. The pubic symphysis is intact. There are minimal degenerative changes at the sacroiliac joint on the left with minimal marginating subchondral edema. There is partially imaged lower lumbar spondylosis.  There are no acute high-grade or full-thickness tendon or muscle tears. There is peritendinous edema at the gluteus minimus and medius tendon insertions bilaterally and at the hamstring tendon origins bilaterally. There is mild associated reactive marrow edema signal in the right ischial tuberosity. There is no muscle atrophy. There is some nonspecific edema in the buttock subcutaneous tissues.  IMPRESSION: Mild left hip osteoarthritis, with chondral wear and subchondral cystic change at the anterior aspect of the acetabulum, and with degenerative tearing of the anterior acetabular labrum with paralabral cyst.  Peritendinous edema at the gluteus minimus and medius tendon insertions bilaterally and at the hamstring tendon origins bilaterally. No acute high-grade or full-thickness tendon or muscle tears.  Partially imaged lower lumbar spondylosis.  --- End of Report ---  GORDO CERON MD; Attending Radiologist This document has been electronically signed. Dec 1 2021 11:03AM  ------------   EXAM: MR SPINE LUMBAR  PROCEDURE DATE: 09/15/2021  INTERPRETATION: EXAMINATION: MRI lumbar spine without contrast  CLINICAL INFORMATION: Lumbar spondylolisthesis. Lower back pain for years.  TECHNIQUE: Multiplanar, multisequential MR imaging was performed.  FINDINGS: Conus terminates at the L1-2 level and is normal in signal. Vertebral body heights are maintained. There is mild anterolisthesis at L3-L4. Alignment is otherwise normal.  There is multilevel disc degeneration. There is moderate disc height loss from T10-T11 through T12-L1 and at L5-S1. There are mild Modic type I endplate changes at L5-S1. There are multilevel Schmorl's nodes  T10-T11, T11-T12: Only imaged on the sagittal series. Minimal disc bulges with osseous ridging. No cord impingement.  T12-L1: Small left paracentral disc extrusion with 3 mm of cranial extension of disc material. No cord impingement. This is superimposed on a minimal disc bulge with osseous ridging. No spinal canal stenosis or foraminal narrowing.  L1-L2: No bulging or herniated intervertebral disc. No spinal canal stenosis or foraminal narrowing.  L2-L3: Minimal disc bulge. No spinal canal stenosis or foraminal narrowing.  L3-L4: Grade 1 anterolisthesis with advanced bilateral facet arthrosis There is uncovering of the posterior aspect of the intervertebral disc and there is a superimposed small right paracentral disc extrusion with 5 mm of cranial extension of disc material. Disc material abuts the exiting right L3 nerve root. Mild to moderate spinal canal stenosis and moderate right and mild left foraminal narrowing.  L4-L5: Minimal disc bulge. Moderate bilateral facet arthrosis. No spinal canal stenosis or foraminal narrowing.  L5-S1: Disc bulge with osseous ridging which is greater on the left. Moderate bilateral foraminal narrowing. No spinal canal stenosis. Moderate bilateral foraminal narrowing at L5-S1.  There is no paraspinal muscle atrophy or edema.  IMPRESSION: Minimal spondylosis, as above. This is most pronounced at the L3-4 level where there is grade 1 anterolisthesis with advanced bilateral facet arthrosis and a small right paracentral disc extrusion with cranial extension. Mild to moderate L3-4 spinal canal stenosis and moderate right foraminal narrowing.  --- End of Report ---  GORDO CERON MD; Attending Radiologist This document has been electronically signed. Sep 21 2021 4:23PM [Poor Appearance] : well-appearing [Acute Distress] : not in acute distress [Abl Mood] : in a normal mood [Abl Affect] : with normal affect [Poor Coordination] : normal coordination [Disorientation] : oriented x 3 [Painful] : not painful [SLR] : negative straight leg raise [FreeTextEntry2] : The pt is awake, alert and oriented to self, place and time, is comfortable and in no acute distress. Gait examination reveals a narrow based, non-ataxic, non-antalgic gait. Can heel and toe walk without difficulty. Inspection of neck, back and lower extremities bilaterally reveals no rashes or ecchymotic lesions.  There is no obvious abnormal spinal curvature in the sagittal and coronal planes. There is no tenderness over the cervical, thoracic spine, or the paraspinal or upper and lower extremities musculature Minimal lumbosacral junction tenderness noted. There is no sacroiliac tenderness. . No atrophy or abnormal movements noted in the upper or lower extremities. There is no swelling noted in the upper or lower extremities bilaterally. No cervical lymphadenopathy noted anteriorly. No joint laxity noted in the upper and lower extremity joints bilaterally.\par   [de-identified] : ;umbar flexion to her ankles and extension 30 degrees [de-identified] : Left groin pain with internal rotation.  Minimal greater trochanteric tenderness.

## 2024-06-11 NOTE — HISTORY OF PRESENT ILLNESS
[6] : a current pain level of 6/10 [Ice] : relieved by ice [Rest] : relieved by rest [Improving] : improving [___ days] : [unfilled] day(s) ago [de-identified] : Patient is here today due to acute low back no radicular pain this episode started one week ago no known injury and not medically treated for this issue. most recent ABEBE left L3 ABEBE on 4/26/16 [de-identified] : alot of pressure [de-identified] : advil

## 2024-06-21 ENCOUNTER — TRANSCRIPTION ENCOUNTER (OUTPATIENT)
Age: 49
End: 2024-06-21

## 2024-06-21 RX ORDER — SEMAGLUTIDE 2.68 MG/ML
8 INJECTION, SOLUTION SUBCUTANEOUS
Qty: 3 | Refills: 3 | Status: ACTIVE | COMMUNITY
Start: 2024-05-10 | End: 1900-01-01

## 2024-07-01 ENCOUNTER — APPOINTMENT (OUTPATIENT)
Dept: MAMMOGRAPHY | Facility: CLINIC | Age: 49
End: 2024-07-01
Payer: MEDICAID

## 2024-07-01 ENCOUNTER — OUTPATIENT (OUTPATIENT)
Dept: OUTPATIENT SERVICES | Facility: HOSPITAL | Age: 49
LOS: 1 days | End: 2024-07-01
Payer: MEDICAID

## 2024-07-01 ENCOUNTER — APPOINTMENT (OUTPATIENT)
Dept: ULTRASOUND IMAGING | Facility: CLINIC | Age: 49
End: 2024-07-01
Payer: MEDICAID

## 2024-07-01 DIAGNOSIS — Z90.710 ACQUIRED ABSENCE OF BOTH CERVIX AND UTERUS: Chronic | ICD-10-CM

## 2024-07-01 DIAGNOSIS — Z00.00 ENCOUNTER FOR GENERAL ADULT MEDICAL EXAMINATION WITHOUT ABNORMAL FINDINGS: ICD-10-CM

## 2024-07-01 PROCEDURE — 76641 ULTRASOUND BREAST COMPLETE: CPT

## 2024-07-01 PROCEDURE — 76641 ULTRASOUND BREAST COMPLETE: CPT | Mod: 26,50

## 2024-07-01 PROCEDURE — 77067 SCR MAMMO BI INCL CAD: CPT

## 2024-07-01 PROCEDURE — 77063 BREAST TOMOSYNTHESIS BI: CPT

## 2024-07-01 PROCEDURE — 77067 SCR MAMMO BI INCL CAD: CPT | Mod: 26

## 2024-07-01 PROCEDURE — 77063 BREAST TOMOSYNTHESIS BI: CPT | Mod: 26

## 2024-10-07 ENCOUNTER — APPOINTMENT (OUTPATIENT)
Dept: ORTHOPEDIC SURGERY | Facility: CLINIC | Age: 49
End: 2024-10-07
Payer: MEDICAID

## 2024-10-07 DIAGNOSIS — S73.192A OTHER SPRAIN OF LEFT HIP, INITIAL ENCOUNTER: ICD-10-CM

## 2024-10-07 PROCEDURE — 73502 X-RAY EXAM HIP UNI 2-3 VIEWS: CPT | Mod: LT

## 2024-10-07 PROCEDURE — 99214 OFFICE O/P EST MOD 30 MIN: CPT

## 2024-11-21 ENCOUNTER — EMERGENCY (EMERGENCY)
Facility: HOSPITAL | Age: 49
LOS: 1 days | Discharge: HOME | End: 2024-11-21
Attending: EMERGENCY MEDICINE
Payer: MEDICAID

## 2024-11-21 VITALS
WEIGHT: 235.01 LBS | HEART RATE: 104 BPM | TEMPERATURE: 99 F | DIASTOLIC BLOOD PRESSURE: 94 MMHG | SYSTOLIC BLOOD PRESSURE: 160 MMHG | HEIGHT: 66 IN | RESPIRATION RATE: 20 BRPM | OXYGEN SATURATION: 98 %

## 2024-11-21 VITALS
RESPIRATION RATE: 18 BRPM | TEMPERATURE: 98 F | HEART RATE: 95 BPM | OXYGEN SATURATION: 97 % | SYSTOLIC BLOOD PRESSURE: 134 MMHG | DIASTOLIC BLOOD PRESSURE: 86 MMHG

## 2024-11-21 DIAGNOSIS — Z90.710 ACQUIRED ABSENCE OF BOTH CERVIX AND UTERUS: Chronic | ICD-10-CM

## 2024-11-21 PROCEDURE — 73590 X-RAY EXAM OF LOWER LEG: CPT

## 2024-11-21 PROCEDURE — 99284 EMERGENCY DEPT VISIT MOD MDM: CPT

## 2024-11-21 PROCEDURE — 99284 EMERGENCY DEPT VISIT MOD MDM: CPT | Mod: 25

## 2024-11-21 PROCEDURE — 73564 X-RAY EXAM KNEE 4 OR MORE: CPT

## 2024-11-21 PROCEDURE — 73564 X-RAY EXAM KNEE 4 OR MORE: CPT | Mod: 26,RT

## 2024-11-21 PROCEDURE — 73590 X-RAY EXAM OF LOWER LEG: CPT | Mod: 26,RT

## 2024-11-21 RX ORDER — DICLOFENAC SODIUM 75 MG/1
1 TABLET, DELAYED RELEASE ORAL
Qty: 8 | Refills: 0
Start: 2024-11-21 | End: 2024-11-24

## 2024-11-21 RX ORDER — IBUPROFEN 200 MG
600 TABLET ORAL ONCE
Refills: 0 | Status: DISCONTINUED | OUTPATIENT
Start: 2024-11-21 | End: 2024-11-21

## 2024-11-21 RX ORDER — DICLOFENAC SODIUM 75 MG/1
50 TABLET, DELAYED RELEASE ORAL ONCE
Refills: 0 | Status: COMPLETED | OUTPATIENT
Start: 2024-11-21 | End: 2024-11-21

## 2024-11-21 RX ORDER — ACETAMINOPHEN 500 MG/5ML
650 LIQUID (ML) ORAL ONCE
Refills: 0 | Status: COMPLETED | OUTPATIENT
Start: 2024-11-21 | End: 2024-11-21

## 2024-11-21 RX ADMIN — DICLOFENAC SODIUM 50 MILLIGRAM(S): 75 TABLET, DELAYED RELEASE ORAL at 10:05

## 2024-11-21 RX ADMIN — Medication 650 MILLIGRAM(S): at 10:03

## 2024-11-22 ENCOUNTER — APPOINTMENT (OUTPATIENT)
Dept: ORTHOPEDIC SURGERY | Facility: CLINIC | Age: 49
End: 2024-11-22

## 2024-11-27 ENCOUNTER — APPOINTMENT (OUTPATIENT)
Dept: ORTHOPEDIC SURGERY | Facility: CLINIC | Age: 49
End: 2024-11-27
Payer: MEDICAID

## 2024-11-27 VITALS — WEIGHT: 235 LBS | BODY MASS INDEX: 37.77 KG/M2 | HEIGHT: 66 IN

## 2024-11-27 DIAGNOSIS — S89.91XA UNSPECIFIED INJURY OF RIGHT LOWER LEG, INITIAL ENCOUNTER: ICD-10-CM

## 2024-11-27 PROCEDURE — 99213 OFFICE O/P EST LOW 20 MIN: CPT

## 2024-12-09 ENCOUNTER — APPOINTMENT (OUTPATIENT)
Dept: INTERNAL MEDICINE | Facility: CLINIC | Age: 49
End: 2024-12-09
Payer: MEDICAID

## 2024-12-09 VITALS
WEIGHT: 233 LBS | HEART RATE: 88 BPM | DIASTOLIC BLOOD PRESSURE: 94 MMHG | OXYGEN SATURATION: 99 % | SYSTOLIC BLOOD PRESSURE: 152 MMHG | BODY MASS INDEX: 37.45 KG/M2 | HEIGHT: 66 IN

## 2024-12-09 VITALS — SYSTOLIC BLOOD PRESSURE: 136 MMHG | DIASTOLIC BLOOD PRESSURE: 80 MMHG

## 2024-12-09 DIAGNOSIS — E78.5 HYPERLIPIDEMIA, UNSPECIFIED: ICD-10-CM

## 2024-12-09 DIAGNOSIS — M25.552 PAIN IN LEFT HIP: ICD-10-CM

## 2024-12-09 DIAGNOSIS — E11.9 TYPE 2 DIABETES MELLITUS W/OUT COMPLICATIONS: ICD-10-CM

## 2024-12-09 PROCEDURE — 99214 OFFICE O/P EST MOD 30 MIN: CPT

## 2024-12-09 RX ORDER — ROSUVASTATIN CALCIUM 5 MG/1
5 TABLET, FILM COATED ORAL
Qty: 90 | Refills: 3 | Status: ACTIVE | COMMUNITY
Start: 2024-12-09 | End: 1900-01-01

## 2024-12-11 ENCOUNTER — APPOINTMENT (OUTPATIENT)
Dept: ORTHOPEDIC SURGERY | Facility: CLINIC | Age: 49
End: 2024-12-11
Payer: MEDICAID

## 2024-12-11 VITALS — HEIGHT: 66 IN | BODY MASS INDEX: 37.45 KG/M2 | WEIGHT: 233 LBS

## 2024-12-11 DIAGNOSIS — M16.12 UNILATERAL PRIMARY OSTEOARTHRITIS, LEFT HIP: ICD-10-CM

## 2024-12-11 PROCEDURE — 99204 OFFICE O/P NEW MOD 45 MIN: CPT | Mod: 25

## 2024-12-11 PROCEDURE — 20611 DRAIN/INJ JOINT/BURSA W/US: CPT | Mod: LT

## 2024-12-15 LAB
ALBUMIN SERPL ELPH-MCNC: 4.5 G/DL
ALP BLD-CCNC: 51 U/L
ALT SERPL-CCNC: 16 U/L
ANION GAP SERPL CALC-SCNC: 16 MMOL/L
AST SERPL-CCNC: 12 U/L
BILIRUB SERPL-MCNC: 0.3 MG/DL
BUN SERPL-MCNC: 15 MG/DL
CALCIUM SERPL-MCNC: 10.2 MG/DL
CHLORIDE SERPL-SCNC: 102 MMOL/L
CHOLEST SERPL-MCNC: 162 MG/DL
CK SERPL-CCNC: 64 U/L
CO2 SERPL-SCNC: 24 MMOL/L
CREAT SERPL-MCNC: 0.52 MG/DL
EGFR: 115 ML/MIN/1.73M2
ESTIMATED AVERAGE GLUCOSE: 134 MG/DL
GLUCOSE SERPL-MCNC: 167 MG/DL
HBA1C MFR BLD HPLC: 6.3 %
HDLC SERPL-MCNC: 61 MG/DL
LDLC SERPL CALC-MCNC: 73 MG/DL
NONHDLC SERPL-MCNC: 101 MG/DL
POTASSIUM SERPL-SCNC: 4.5 MMOL/L
PROT SERPL-MCNC: 7.7 G/DL
SODIUM SERPL-SCNC: 141 MMOL/L
TRIGL SERPL-MCNC: 171 MG/DL

## 2025-01-30 ENCOUNTER — TRANSCRIPTION ENCOUNTER (OUTPATIENT)
Age: 50
End: 2025-01-30

## 2025-01-31 ENCOUNTER — APPOINTMENT (OUTPATIENT)
Dept: ORTHOPEDIC SURGERY | Facility: CLINIC | Age: 50
End: 2025-01-31
Payer: MEDICAID

## 2025-01-31 ENCOUNTER — NON-APPOINTMENT (OUTPATIENT)
Age: 50
End: 2025-01-31

## 2025-01-31 DIAGNOSIS — G57.11 MERALGIA PARESTHETICA, RIGHT LOWER LIMB: ICD-10-CM

## 2025-01-31 PROCEDURE — 99213 OFFICE O/P EST LOW 20 MIN: CPT

## 2025-01-31 RX ORDER — LIDOCAINE 5 G/100G
5 OINTMENT TOPICAL
Qty: 1 | Refills: 2 | Status: ACTIVE | COMMUNITY
Start: 2025-01-31 | End: 1900-01-01

## 2025-02-11 ENCOUNTER — TRANSCRIPTION ENCOUNTER (OUTPATIENT)
Age: 50
End: 2025-02-11

## 2025-02-24 ENCOUNTER — APPOINTMENT (OUTPATIENT)
Dept: ORTHOPEDIC SURGERY | Facility: CLINIC | Age: 50
End: 2025-02-24

## 2025-03-07 ENCOUNTER — APPOINTMENT (OUTPATIENT)
Dept: ORTHOPEDIC SURGERY | Facility: CLINIC | Age: 50
End: 2025-03-07
Payer: MEDICAID

## 2025-03-07 DIAGNOSIS — G57.11 MERALGIA PARESTHETICA, RIGHT LOWER LIMB: ICD-10-CM

## 2025-03-07 DIAGNOSIS — S89.91XA UNSPECIFIED INJURY OF RIGHT LOWER LEG, INITIAL ENCOUNTER: ICD-10-CM

## 2025-03-07 PROCEDURE — 99213 OFFICE O/P EST LOW 20 MIN: CPT

## 2025-03-15 ENCOUNTER — OUTPATIENT (OUTPATIENT)
Dept: OUTPATIENT SERVICES | Facility: HOSPITAL | Age: 50
LOS: 1 days | End: 2025-03-15
Payer: MEDICAID

## 2025-03-15 ENCOUNTER — APPOINTMENT (OUTPATIENT)
Dept: MRI IMAGING | Facility: IMAGING CENTER | Age: 50
End: 2025-03-15
Payer: MEDICAID

## 2025-03-15 DIAGNOSIS — Z90.710 ACQUIRED ABSENCE OF BOTH CERVIX AND UTERUS: Chronic | ICD-10-CM

## 2025-03-15 DIAGNOSIS — S89.91XA UNSPECIFIED INJURY OF RIGHT LOWER LEG, INITIAL ENCOUNTER: ICD-10-CM

## 2025-03-15 PROCEDURE — 73721 MRI JNT OF LWR EXTRE W/O DYE: CPT

## 2025-03-15 PROCEDURE — 73721 MRI JNT OF LWR EXTRE W/O DYE: CPT | Mod: 26,RT

## 2025-03-17 ENCOUNTER — OUTPATIENT (OUTPATIENT)
Dept: OUTPATIENT SERVICES | Facility: HOSPITAL | Age: 50
LOS: 1 days | End: 2025-03-17
Payer: MEDICAID

## 2025-03-17 ENCOUNTER — NON-APPOINTMENT (OUTPATIENT)
Age: 50
End: 2025-03-17

## 2025-03-17 ENCOUNTER — APPOINTMENT (OUTPATIENT)
Dept: ULTRASOUND IMAGING | Facility: CLINIC | Age: 50
End: 2025-03-17
Payer: MEDICAID

## 2025-03-17 DIAGNOSIS — G57.11 MERALGIA PARESTHETICA, RIGHT LOWER LIMB: ICD-10-CM

## 2025-03-17 DIAGNOSIS — Z90.710 ACQUIRED ABSENCE OF BOTH CERVIX AND UTERUS: Chronic | ICD-10-CM

## 2025-03-17 PROCEDURE — 20611 DRAIN/INJ JOINT/BURSA W/US: CPT | Mod: RT

## 2025-03-17 PROCEDURE — 20611 DRAIN/INJ JOINT/BURSA W/US: CPT

## 2025-04-11 ENCOUNTER — RX RENEWAL (OUTPATIENT)
Age: 50
End: 2025-04-11

## 2025-04-28 ENCOUNTER — APPOINTMENT (OUTPATIENT)
Dept: ORTHOPEDIC SURGERY | Facility: CLINIC | Age: 50
End: 2025-04-28

## 2025-05-19 ENCOUNTER — APPOINTMENT (OUTPATIENT)
Dept: INTERNAL MEDICINE | Facility: CLINIC | Age: 50
End: 2025-05-19
Payer: MEDICAID

## 2025-05-19 ENCOUNTER — NON-APPOINTMENT (OUTPATIENT)
Age: 50
End: 2025-05-19

## 2025-05-19 VITALS
HEIGHT: 66 IN | BODY MASS INDEX: 38.09 KG/M2 | HEART RATE: 91 BPM | DIASTOLIC BLOOD PRESSURE: 84 MMHG | SYSTOLIC BLOOD PRESSURE: 130 MMHG | OXYGEN SATURATION: 97 % | WEIGHT: 237 LBS

## 2025-05-19 DIAGNOSIS — Z00.00 ENCOUNTER FOR GENERAL ADULT MEDICAL EXAMINATION W/OUT ABNORMAL FINDINGS: ICD-10-CM

## 2025-05-19 DIAGNOSIS — M16.12 UNILATERAL PRIMARY OSTEOARTHRITIS, LEFT HIP: ICD-10-CM

## 2025-05-19 DIAGNOSIS — E55.9 VITAMIN D DEFICIENCY, UNSPECIFIED: ICD-10-CM

## 2025-05-19 DIAGNOSIS — E78.5 HYPERLIPIDEMIA, UNSPECIFIED: ICD-10-CM

## 2025-05-19 PROCEDURE — 99396 PREV VISIT EST AGE 40-64: CPT

## 2025-05-21 DIAGNOSIS — G47.30 SLEEP APNEA, UNSPECIFIED: ICD-10-CM

## 2025-05-27 ENCOUNTER — OFFICE (OUTPATIENT)
Facility: LOCATION | Age: 50
Setting detail: OPHTHALMOLOGY
End: 2025-05-27
Payer: COMMERCIAL

## 2025-05-27 DIAGNOSIS — E11.9: ICD-10-CM

## 2025-05-27 DIAGNOSIS — H16.223: ICD-10-CM

## 2025-05-27 DIAGNOSIS — H25.13: ICD-10-CM

## 2025-05-27 DIAGNOSIS — H01.001: ICD-10-CM

## 2025-05-27 PROBLEM — H10.45 ALLERGIC CONJUNCTIVITIS: Status: ACTIVE | Noted: 2025-05-27

## 2025-05-27 PROBLEM — H35.033 HYPERTENSIVE RETINOPATHY; BOTH EYES: Status: ACTIVE | Noted: 2025-05-27

## 2025-05-27 PROCEDURE — 92134 CPTRZ OPH DX IMG PST SGM RTA: CPT | Performed by: OPHTHALMOLOGY

## 2025-05-27 PROCEDURE — 92014 COMPRE OPH EXAM EST PT 1/>: CPT | Performed by: OPHTHALMOLOGY

## 2025-05-27 ASSESSMENT — REFRACTION_AUTOREFRACTION
OD_SPHERE: -1.50
OS_AXIS: 099
OS_SPHERE: -1.25
OD_AXIS: 087
OS_CYLINDER: -1.50
OD_CYLINDER: -1.75

## 2025-05-27 ASSESSMENT — REFRACTION_CURRENTRX
OS_VPRISM_DIRECTION: SV
OS_AXIS: 092
OS_AXIS: 093
OS_CYLINDER: -1.00
OD_CYLINDER: -1.25
OD_VPRISM_DIRECTION: SV
OD_OVR_VA: 20/
OS_SPHERE: -1.75
OD_OVR_VA: 20/
OS_OVR_VA: 20/
OS_CYLINDER: -1.00
OD_CYLINDER: -1.25
OS_VPRISM_DIRECTION: SV
OD_SPHERE: -2.00
OS_SPHERE: -1.75
OD_VPRISM_DIRECTION: SV
OD_AXIS: 087
OD_SPHERE: -2.00
OS_OVR_VA: 20/
OD_AXIS: 094

## 2025-05-27 ASSESSMENT — KERATOMETRY
OD_K1POWER_DIOPTERS: 44.25
OD_AXISANGLE_DEGREES: 171
OS_K2POWER_DIOPTERS: 44.25
OD_K2POWER_DIOPTERS: 44.50
OS_K1POWER_DIOPTERS: 44.00
OS_AXISANGLE_DEGREES: 057

## 2025-05-27 ASSESSMENT — LID EXAM ASSESSMENTS
OD_BLEPHARITIS: RLL RUL 2+
OS_BLEPHARITIS: LLL LUL 2+

## 2025-05-27 ASSESSMENT — CONFRONTATIONAL VISUAL FIELD TEST (CVF)
OD_FINDINGS: FULL
OS_FINDINGS: FULL

## 2025-05-27 ASSESSMENT — TEAR BREAK UP TIME (TBUT)
OD_TBUT: 2+
OS_TBUT: 2+

## 2025-05-27 ASSESSMENT — VISUAL ACUITY
OS_BCVA: 20/20
OD_BCVA: 20/20

## 2025-05-28 ENCOUNTER — TRANSCRIPTION ENCOUNTER (OUTPATIENT)
Age: 50
End: 2025-05-28

## 2025-05-28 DIAGNOSIS — E11.9 TYPE 2 DIABETES MELLITUS W/OUT COMPLICATIONS: ICD-10-CM

## 2025-05-28 LAB
25(OH)D3 SERPL-MCNC: 57.7 NG/ML
ALBUMIN SERPL ELPH-MCNC: 4.3 G/DL
ALP BLD-CCNC: 55 U/L
ALT SERPL-CCNC: 21 U/L
ANION GAP SERPL CALC-SCNC: 15 MMOL/L
AST SERPL-CCNC: 13 U/L
BASOPHILS # BLD AUTO: 0.09 K/UL
BASOPHILS NFR BLD AUTO: 1.1 %
BILIRUB SERPL-MCNC: 0.3 MG/DL
BUN SERPL-MCNC: 14 MG/DL
CALCIUM SERPL-MCNC: 10.3 MG/DL
CHLORIDE SERPL-SCNC: 105 MMOL/L
CHOLEST SERPL-MCNC: 126 MG/DL
CO2 SERPL-SCNC: 26 MMOL/L
CREAT SERPL-MCNC: 0.56 MG/DL
CREAT SPEC-SCNC: 123 MG/DL
EGFRCR SERPLBLD CKD-EPI 2021: 113 ML/MIN/1.73M2
EOSINOPHIL # BLD AUTO: 0.27 K/UL
EOSINOPHIL NFR BLD AUTO: 3.2 %
ESTIMATED AVERAGE GLUCOSE: 148 MG/DL
GLUCOSE SERPL-MCNC: 152 MG/DL
HBA1C MFR BLD HPLC: 6.8 %
HCT VFR BLD CALC: 44.3 %
HDLC SERPL-MCNC: 59 MG/DL
HGB BLD-MCNC: 13 G/DL
IMM GRANULOCYTES NFR BLD AUTO: 0.2 %
LDLC SERPL-MCNC: 47 MG/DL
LYMPHOCYTES # BLD AUTO: 1.81 K/UL
LYMPHOCYTES NFR BLD AUTO: 21.7 %
MAN DIFF?: NORMAL
MCHC RBC-ENTMCNC: 26.3 PG
MCHC RBC-ENTMCNC: 29.3 G/DL
MCV RBC AUTO: 89.7 FL
MICROALBUMIN 24H UR DL<=1MG/L-MCNC: 3.4 MG/DL
MICROALBUMIN/CREAT 24H UR-RTO: 27 MG/G
MONOCYTES # BLD AUTO: 0.54 K/UL
MONOCYTES NFR BLD AUTO: 6.5 %
NEUTROPHILS # BLD AUTO: 5.61 K/UL
NEUTROPHILS NFR BLD AUTO: 67.3 %
NONHDLC SERPL-MCNC: 67 MG/DL
PLATELET # BLD AUTO: 369 K/UL
POTASSIUM SERPL-SCNC: 5.4 MMOL/L
PROT SERPL-MCNC: 7.5 G/DL
RBC # BLD: 4.94 M/UL
RBC # FLD: 14.6 %
SODIUM SERPL-SCNC: 146 MMOL/L
TRIGL SERPL-MCNC: 107 MG/DL
TSH SERPL-ACNC: 1.23 UIU/ML
VIT B12 SERPL-MCNC: 1010 PG/ML
WBC # FLD AUTO: 8.34 K/UL

## 2025-05-29 ENCOUNTER — TRANSCRIPTION ENCOUNTER (OUTPATIENT)
Age: 50
End: 2025-05-29

## 2025-06-03 RX ORDER — TIRZEPATIDE 2.5 MG/.5ML
2.5 INJECTION, SOLUTION SUBCUTANEOUS
Qty: 1 | Refills: 1 | Status: ACTIVE | COMMUNITY
Start: 2025-05-28

## 2025-07-11 ENCOUNTER — TRANSCRIPTION ENCOUNTER (OUTPATIENT)
Age: 50
End: 2025-07-11

## 2025-07-15 ENCOUNTER — TRANSCRIPTION ENCOUNTER (OUTPATIENT)
Age: 50
End: 2025-07-15

## 2025-08-08 ENCOUNTER — APPOINTMENT (OUTPATIENT)
Dept: ULTRASOUND IMAGING | Facility: IMAGING CENTER | Age: 50
End: 2025-08-08
Payer: MEDICAID

## 2025-08-08 ENCOUNTER — OUTPATIENT (OUTPATIENT)
Dept: OUTPATIENT SERVICES | Facility: HOSPITAL | Age: 50
LOS: 1 days | End: 2025-08-08
Payer: MEDICAID

## 2025-08-08 ENCOUNTER — APPOINTMENT (OUTPATIENT)
Dept: MAMMOGRAPHY | Facility: IMAGING CENTER | Age: 50
End: 2025-08-08
Payer: MEDICAID

## 2025-08-08 DIAGNOSIS — Z00.8 ENCOUNTER FOR OTHER GENERAL EXAMINATION: ICD-10-CM

## 2025-08-08 DIAGNOSIS — Z90.710 ACQUIRED ABSENCE OF BOTH CERVIX AND UTERUS: Chronic | ICD-10-CM

## 2025-08-08 PROCEDURE — 77067 SCR MAMMO BI INCL CAD: CPT | Mod: 26

## 2025-08-08 PROCEDURE — 77063 BREAST TOMOSYNTHESIS BI: CPT | Mod: 26

## 2025-08-08 PROCEDURE — 77063 BREAST TOMOSYNTHESIS BI: CPT

## 2025-08-08 PROCEDURE — 77067 SCR MAMMO BI INCL CAD: CPT

## 2025-08-08 PROCEDURE — 76641 ULTRASOUND BREAST COMPLETE: CPT | Mod: 26,50

## 2025-08-08 PROCEDURE — 76641 ULTRASOUND BREAST COMPLETE: CPT
